# Patient Record
Sex: MALE | URBAN - METROPOLITAN AREA
[De-identification: names, ages, dates, MRNs, and addresses within clinical notes are randomized per-mention and may not be internally consistent; named-entity substitution may affect disease eponyms.]

---

## 2017-04-27 ENCOUNTER — INPATIENT (INPATIENT)
Facility: HOSPITAL | Age: 52
LOS: 1 days | Discharge: HOME | End: 2017-04-29
Attending: INTERNAL MEDICINE | Admitting: INTERNAL MEDICINE
Payer: MEDICAID

## 2017-04-27 VITALS
TEMPERATURE: 99 F | HEART RATE: 103 BPM | RESPIRATION RATE: 16 BRPM | OXYGEN SATURATION: 100 % | SYSTOLIC BLOOD PRESSURE: 133 MMHG | DIASTOLIC BLOOD PRESSURE: 94 MMHG

## 2017-04-27 DIAGNOSIS — L03.90 CELLULITIS, UNSPECIFIED: ICD-10-CM

## 2017-04-27 LAB
ALBUMIN SERPL ELPH-MCNC: 3.5 G/DL — SIGNIFICANT CHANGE UP (ref 3.3–5)
ALP SERPL-CCNC: 54 U/L — SIGNIFICANT CHANGE UP (ref 40–120)
ALT FLD-CCNC: 23 U/L — SIGNIFICANT CHANGE UP (ref 4–41)
AST SERPL-CCNC: 25 U/L — SIGNIFICANT CHANGE UP (ref 4–40)
BASE EXCESS BLDV CALC-SCNC: 1.8 MMOL/L — SIGNIFICANT CHANGE UP
BILIRUB SERPL-MCNC: 0.3 MG/DL — SIGNIFICANT CHANGE UP (ref 0.2–1.2)
BLOOD GAS VENOUS - CREATININE: 0.68 MG/DL — SIGNIFICANT CHANGE UP (ref 0.5–1.3)
BUN SERPL-MCNC: 7 MG/DL — SIGNIFICANT CHANGE UP (ref 7–23)
CALCIUM SERPL-MCNC: 8.8 MG/DL — SIGNIFICANT CHANGE UP (ref 8.4–10.5)
CHLORIDE BLDV-SCNC: 102 MMOL/L — SIGNIFICANT CHANGE UP (ref 96–108)
CHLORIDE SERPL-SCNC: 97 MMOL/L — LOW (ref 98–107)
CO2 SERPL-SCNC: 23 MMOL/L — SIGNIFICANT CHANGE UP (ref 22–31)
CREAT SERPL-MCNC: 0.7 MG/DL — SIGNIFICANT CHANGE UP (ref 0.5–1.3)
GAS PNL BLDV: 129 MMOL/L — LOW (ref 136–146)
GLUCOSE BLDV-MCNC: 113 — HIGH (ref 70–99)
GLUCOSE SERPL-MCNC: 112 MG/DL — HIGH (ref 70–99)
HCO3 BLDV-SCNC: 26 MMOL/L — SIGNIFICANT CHANGE UP (ref 20–27)
HCT VFR BLD CALC: 41.2 % — SIGNIFICANT CHANGE UP (ref 39–50)
HCT VFR BLDV CALC: 42 % — SIGNIFICANT CHANGE UP (ref 39–51)
HGB BLD-MCNC: 13.5 G/DL — SIGNIFICANT CHANGE UP (ref 13–17)
HGB BLDV-MCNC: 13.7 G/DL — SIGNIFICANT CHANGE UP (ref 13–17)
LACTATE BLDV-MCNC: 1.2 MMOL/L — SIGNIFICANT CHANGE UP (ref 0.5–2)
MCHC RBC-ENTMCNC: 29.5 PG — SIGNIFICANT CHANGE UP (ref 27–34)
MCHC RBC-ENTMCNC: 32.8 % — SIGNIFICANT CHANGE UP (ref 32–36)
MCV RBC AUTO: 90 FL — SIGNIFICANT CHANGE UP (ref 80–100)
PCO2 BLDV: 40 MMHG — LOW (ref 41–51)
PH BLDV: 7.43 PH — SIGNIFICANT CHANGE UP (ref 7.32–7.43)
PLATELET # BLD AUTO: 175 K/UL — SIGNIFICANT CHANGE UP (ref 150–400)
PMV BLD: 9.7 FL — SIGNIFICANT CHANGE UP (ref 7–13)
PO2 BLDV: 44 MMHG — HIGH (ref 35–40)
POTASSIUM BLDV-SCNC: 3.5 MMOL/L — SIGNIFICANT CHANGE UP (ref 3.4–4.5)
POTASSIUM SERPL-MCNC: 4 MMOL/L — SIGNIFICANT CHANGE UP (ref 3.5–5.3)
POTASSIUM SERPL-SCNC: 4 MMOL/L — SIGNIFICANT CHANGE UP (ref 3.5–5.3)
PROT SERPL-MCNC: 7.2 G/DL — SIGNIFICANT CHANGE UP (ref 6–8.3)
RBC # BLD: 4.58 M/UL — SIGNIFICANT CHANGE UP (ref 4.2–5.8)
RBC # FLD: 13.6 % — SIGNIFICANT CHANGE UP (ref 10.3–14.5)
SAO2 % BLDV: 77.4 % — SIGNIFICANT CHANGE UP (ref 60–85)
SODIUM SERPL-SCNC: 133 MMOL/L — LOW (ref 135–145)
WBC # BLD: 6.97 K/UL — SIGNIFICANT CHANGE UP (ref 3.8–10.5)
WBC # FLD AUTO: 6.97 K/UL — SIGNIFICANT CHANGE UP (ref 3.8–10.5)

## 2017-04-27 PROCEDURE — 99223 1ST HOSP IP/OBS HIGH 75: CPT

## 2017-04-27 RX ORDER — SODIUM CHLORIDE 9 MG/ML
2000 INJECTION INTRAMUSCULAR; INTRAVENOUS; SUBCUTANEOUS ONCE
Qty: 0 | Refills: 0 | Status: COMPLETED | OUTPATIENT
Start: 2017-04-27 | End: 2017-04-27

## 2017-04-27 RX ORDER — ACETAMINOPHEN 500 MG
650 TABLET ORAL ONCE
Qty: 0 | Refills: 0 | Status: COMPLETED | OUTPATIENT
Start: 2017-04-27 | End: 2017-04-27

## 2017-04-27 RX ADMIN — SODIUM CHLORIDE 2000 MILLILITER(S): 9 INJECTION INTRAMUSCULAR; INTRAVENOUS; SUBCUTANEOUS at 22:10

## 2017-04-27 RX ADMIN — Medication 650 MILLIGRAM(S): at 22:10

## 2017-04-27 RX ADMIN — Medication 100 MILLIGRAM(S): at 22:10

## 2017-04-27 RX ADMIN — Medication 650 MILLIGRAM(S): at 23:20

## 2017-04-27 NOTE — ED ADULT NURSE NOTE - OBJECTIVE STATEMENT
Pt received into intake room 9 co right thigh pain,  swelling, redness x 3 days worsening today. Pt A&Ox4, in NAD. Pt right thigh swollen, large reddened area; pt states pain is 2/10. Pt denies trauma to area, recent illness. code sepsis called, Md evaluated, Vs as noted. 20 g Iv inserted to L AC, labs sent, cultures sent. Medicated as ordered. Family at bedside, call bell within reach, will continue to monitor closely.

## 2017-04-27 NOTE — ED ADULT TRIAGE NOTE - CHIEF COMPLAINT QUOTE
pt c/o of right upper thigh redness with pain swelling and fever for the past four days. Pt noted with erythema skin warm to touch he denies any lip or tongue swelling denies any injury.

## 2017-04-27 NOTE — H&P ADULT. - PROBLEM SELECTOR PLAN 1
- Continue with clindamycin  - Tylenol as needed for fever or pain - Continue with clindamycin  - Tylenol as needed for fever or pain  - Follow up RLE ultrasound

## 2017-04-27 NOTE — ED PROVIDER NOTE - OBJECTIVE STATEMENT
51 yr old male with PMH of HTN presents with 4 days of anterior right leg redness, pain, tenderness and swelling.  States for 2 years has had mild swelling in leg but Monday redness started and extended.  Denies trouble walking, pain to feet, discoloration, calf tenderness.

## 2017-04-27 NOTE — H&P ADULT. - PROBLEM SELECTOR PLAN 2
- Low sodium diet  - Metoprolol succinate 100mg PO dailyl - Low sodium diet  - Metoprolol succinate 100mg PO daily  - Add amlodipine for further BP titrate if needed

## 2017-04-27 NOTE — H&P ADULT. - LYMPHATIC
supraclavicular R/anterior cervical R/posterior cervical L/anterior cervical L/supraclavicular L/posterior cervical R

## 2017-04-27 NOTE — H&P ADULT. - HISTORY OF PRESENT ILLNESS
51 y.o. man with history of essential hypertension who was sent to the ER by his PMD for evaluation of right thigh erythema, edema, and pain of 4 days in duration. patient states that he was in his usual state of health when his symptoms started. He reports right thigh erythema, edema, and progressive pain. In ED, patient was given clindamycin. Presently, he reports ongoing mild right thigh pain. No other complaints.

## 2017-04-27 NOTE — ED ADULT NURSE NOTE - ED STAT RN HANDOFF DETAILS
Patient is in NAD, and has room available.  Report given to nurse on floor via phone.  Patient awaiting transportation.  Will continue to monitor patient closely. YOHAN Miller R.N.

## 2017-04-28 DIAGNOSIS — C85.90 NON-HODGKIN LYMPHOMA, UNSPECIFIED, UNSPECIFIED SITE: ICD-10-CM

## 2017-04-28 DIAGNOSIS — L03.115 CELLULITIS OF RIGHT LOWER LIMB: ICD-10-CM

## 2017-04-28 DIAGNOSIS — I10 ESSENTIAL (PRIMARY) HYPERTENSION: ICD-10-CM

## 2017-04-28 DIAGNOSIS — Z29.9 ENCOUNTER FOR PROPHYLACTIC MEASURES, UNSPECIFIED: ICD-10-CM

## 2017-04-28 LAB
BASOPHILS # BLD AUTO: 0.02 K/UL — SIGNIFICANT CHANGE UP (ref 0–0.2)
BASOPHILS NFR BLD AUTO: 0.3 % — SIGNIFICANT CHANGE UP (ref 0–2)
BUN SERPL-MCNC: 5 MG/DL — LOW (ref 7–23)
CALCIUM SERPL-MCNC: 8.3 MG/DL — LOW (ref 8.4–10.5)
CHLORIDE SERPL-SCNC: 103 MMOL/L — SIGNIFICANT CHANGE UP (ref 98–107)
CO2 SERPL-SCNC: 22 MMOL/L — SIGNIFICANT CHANGE UP (ref 22–31)
CREAT SERPL-MCNC: 0.64 MG/DL — SIGNIFICANT CHANGE UP (ref 0.5–1.3)
EOSINOPHIL # BLD AUTO: 0.09 K/UL — SIGNIFICANT CHANGE UP (ref 0–0.5)
EOSINOPHIL NFR BLD AUTO: 1.4 % — SIGNIFICANT CHANGE UP (ref 0–6)
GLUCOSE SERPL-MCNC: 100 MG/DL — HIGH (ref 70–99)
HCT VFR BLD CALC: 40.2 % — SIGNIFICANT CHANGE UP (ref 39–50)
HGB BLD-MCNC: 13.1 G/DL — SIGNIFICANT CHANGE UP (ref 13–17)
IMM GRANULOCYTES NFR BLD AUTO: 0.2 % — SIGNIFICANT CHANGE UP (ref 0–1.5)
LYMPHOCYTES # BLD AUTO: 2.76 K/UL — SIGNIFICANT CHANGE UP (ref 1–3.3)
LYMPHOCYTES # BLD AUTO: 42.4 % — SIGNIFICANT CHANGE UP (ref 13–44)
MCHC RBC-ENTMCNC: 29.5 PG — SIGNIFICANT CHANGE UP (ref 27–34)
MCHC RBC-ENTMCNC: 32.6 % — SIGNIFICANT CHANGE UP (ref 32–36)
MCV RBC AUTO: 90.5 FL — SIGNIFICANT CHANGE UP (ref 80–100)
MONOCYTES # BLD AUTO: 0.44 K/UL — SIGNIFICANT CHANGE UP (ref 0–0.9)
MONOCYTES NFR BLD AUTO: 6.8 % — SIGNIFICANT CHANGE UP (ref 2–14)
NEUTROPHILS # BLD AUTO: 3.19 K/UL — SIGNIFICANT CHANGE UP (ref 1.8–7.4)
NEUTROPHILS NFR BLD AUTO: 48.9 % — SIGNIFICANT CHANGE UP (ref 43–77)
PLATELET # BLD AUTO: 166 K/UL — SIGNIFICANT CHANGE UP (ref 150–400)
PMV BLD: 9.8 FL — SIGNIFICANT CHANGE UP (ref 7–13)
POTASSIUM SERPL-MCNC: 4.1 MMOL/L — SIGNIFICANT CHANGE UP (ref 3.5–5.3)
POTASSIUM SERPL-SCNC: 4.1 MMOL/L — SIGNIFICANT CHANGE UP (ref 3.5–5.3)
RBC # BLD: 4.44 M/UL — SIGNIFICANT CHANGE UP (ref 4.2–5.8)
RBC # FLD: 13.7 % — SIGNIFICANT CHANGE UP (ref 10.3–14.5)
SODIUM SERPL-SCNC: 138 MMOL/L — SIGNIFICANT CHANGE UP (ref 135–145)
SPECIMEN SOURCE: SIGNIFICANT CHANGE UP
SPECIMEN SOURCE: SIGNIFICANT CHANGE UP
WBC # BLD: 6.51 K/UL — SIGNIFICANT CHANGE UP (ref 3.8–10.5)
WBC # FLD AUTO: 6.51 K/UL — SIGNIFICANT CHANGE UP (ref 3.8–10.5)

## 2017-04-28 PROCEDURE — 99222 1ST HOSP IP/OBS MODERATE 55: CPT

## 2017-04-28 PROCEDURE — 93971 EXTREMITY STUDY: CPT | Mod: 26,LT

## 2017-04-28 RX ORDER — ENOXAPARIN SODIUM 100 MG/ML
40 INJECTION SUBCUTANEOUS DAILY
Qty: 0 | Refills: 0 | Status: DISCONTINUED | OUTPATIENT
Start: 2017-04-28 | End: 2017-04-29

## 2017-04-28 RX ORDER — METOPROLOL TARTRATE 50 MG
100 TABLET ORAL DAILY
Qty: 0 | Refills: 0 | Status: DISCONTINUED | OUTPATIENT
Start: 2017-04-28 | End: 2017-04-29

## 2017-04-28 RX ORDER — ACETAMINOPHEN 500 MG
650 TABLET ORAL EVERY 6 HOURS
Qty: 0 | Refills: 0 | Status: DISCONTINUED | OUTPATIENT
Start: 2017-04-28 | End: 2017-04-29

## 2017-04-28 RX ORDER — LANOLIN ALCOHOL/MO/W.PET/CERES
3 CREAM (GRAM) TOPICAL AT BEDTIME
Qty: 0 | Refills: 0 | Status: DISCONTINUED | OUTPATIENT
Start: 2017-04-28 | End: 2017-04-29

## 2017-04-28 RX ADMIN — Medication 100 MILLIGRAM(S): at 05:44

## 2017-04-28 RX ADMIN — Medication 100 MILLIGRAM(S): at 12:06

## 2017-04-28 RX ADMIN — Medication 3 MILLIGRAM(S): at 23:59

## 2017-04-28 RX ADMIN — ENOXAPARIN SODIUM 40 MILLIGRAM(S): 100 INJECTION SUBCUTANEOUS at 21:51

## 2017-04-28 RX ADMIN — Medication 100 MILLIGRAM(S): at 21:50

## 2017-04-29 VITALS
SYSTOLIC BLOOD PRESSURE: 131 MMHG | TEMPERATURE: 99 F | HEART RATE: 79 BPM | RESPIRATION RATE: 18 BRPM | DIASTOLIC BLOOD PRESSURE: 81 MMHG | OXYGEN SATURATION: 100 %

## 2017-04-29 LAB
BUN SERPL-MCNC: 6 MG/DL — LOW (ref 7–23)
CALCIUM SERPL-MCNC: 8.4 MG/DL — SIGNIFICANT CHANGE UP (ref 8.4–10.5)
CHLORIDE SERPL-SCNC: 100 MMOL/L — SIGNIFICANT CHANGE UP (ref 98–107)
CO2 SERPL-SCNC: 21 MMOL/L — LOW (ref 22–31)
CREAT SERPL-MCNC: 0.61 MG/DL — SIGNIFICANT CHANGE UP (ref 0.5–1.3)
GLUCOSE SERPL-MCNC: 94 MG/DL — SIGNIFICANT CHANGE UP (ref 70–99)
HCT VFR BLD CALC: 39 % — SIGNIFICANT CHANGE UP (ref 39–50)
HGB BLD-MCNC: 12.7 G/DL — LOW (ref 13–17)
MCHC RBC-ENTMCNC: 29.3 PG — SIGNIFICANT CHANGE UP (ref 27–34)
MCHC RBC-ENTMCNC: 32.6 % — SIGNIFICANT CHANGE UP (ref 32–36)
MCV RBC AUTO: 90.1 FL — SIGNIFICANT CHANGE UP (ref 80–100)
PLATELET # BLD AUTO: 204 K/UL — SIGNIFICANT CHANGE UP (ref 150–400)
PMV BLD: 9.6 FL — SIGNIFICANT CHANGE UP (ref 7–13)
POTASSIUM SERPL-MCNC: 3.8 MMOL/L — SIGNIFICANT CHANGE UP (ref 3.5–5.3)
POTASSIUM SERPL-SCNC: 3.8 MMOL/L — SIGNIFICANT CHANGE UP (ref 3.5–5.3)
RBC # BLD: 4.33 M/UL — SIGNIFICANT CHANGE UP (ref 4.2–5.8)
RBC # FLD: 13.9 % — SIGNIFICANT CHANGE UP (ref 10.3–14.5)
SODIUM SERPL-SCNC: 134 MMOL/L — LOW (ref 135–145)
WBC # BLD: 6.09 K/UL — SIGNIFICANT CHANGE UP (ref 3.8–10.5)
WBC # FLD AUTO: 6.09 K/UL — SIGNIFICANT CHANGE UP (ref 3.8–10.5)

## 2017-04-29 PROCEDURE — 99232 SBSQ HOSP IP/OBS MODERATE 35: CPT

## 2017-04-29 RX ORDER — CEPHALEXIN 500 MG
1 CAPSULE ORAL
Qty: 28 | Refills: 0 | OUTPATIENT
Start: 2017-04-29 | End: 2017-05-06

## 2017-04-29 RX ADMIN — Medication 100 MILLIGRAM(S): at 06:04

## 2017-04-29 NOTE — DISCHARGE NOTE ADULT - MEDICATION SUMMARY - MEDICATIONS TO TAKE
I will START or STAY ON the medications listed below when I get home from the hospital:    metoprolol succinate 100 mg oral tablet, extended release  -- 1 tab(s) by mouth once a day  -- Indication: For Blood Pressure    Keflex 500 mg oral capsule  -- 1 cap(s) by mouth 4 times a day  -- Finish all this medication unless otherwise directed by prescriber.    -- Indication: For Cellulitis

## 2017-04-29 NOTE — DISCHARGE NOTE ADULT - HOSPITAL COURSE
51 y.o. man with history of lymphoma now with right lower extremity cellulitis.  Received clindamycin IV.   RLE US negative for DVT.  Followed by House ID who recommended Keflex 500mg PO 4x daily on d/c for 7 days.    Essential hypertension:  - Low sodium diet  - Metoprolol succinate 100mg PO daily      Lymphoma, unspecified body region, unspecified lymphoma type:  - Clinically stable.       Medically cleared for d/c home.

## 2017-04-29 NOTE — DISCHARGE NOTE ADULT - PLAN OF CARE
Finish antibiotics Complete antibiotics as ordered.  Follow up with your PMD within one week of discharge to discuss your admission. Continue with medication as prescribed.

## 2017-04-29 NOTE — DISCHARGE NOTE ADULT - PATIENT PORTAL LINK FT
“You can access the [facility] Patient Portal, offered by [facility], by registering with the following website: [email or web address]”

## 2017-05-02 LAB
BACTERIA BLD CULT: SIGNIFICANT CHANGE UP
BACTERIA BLD CULT: SIGNIFICANT CHANGE UP

## 2017-06-26 ENCOUNTER — INPATIENT (INPATIENT)
Facility: HOSPITAL | Age: 52
LOS: 3 days | Discharge: HOME | End: 2017-06-30
Attending: HOSPITALIST | Admitting: HOSPITALIST
Payer: MEDICAID

## 2017-06-26 VITALS
DIASTOLIC BLOOD PRESSURE: 91 MMHG | HEART RATE: 107 BPM | RESPIRATION RATE: 16 BRPM | SYSTOLIC BLOOD PRESSURE: 147 MMHG | TEMPERATURE: 103 F | OXYGEN SATURATION: 98 %

## 2017-06-26 NOTE — ED ADULT TRIAGE NOTE - CHIEF COMPLAINT QUOTE
Pt c/o fever, chills and R leg pain and redness since yesterday. Denies n/v/d, abdominal pain, chest pain. Last took 400mg Advil at 8pm

## 2017-06-27 DIAGNOSIS — Z29.9 ENCOUNTER FOR PROPHYLACTIC MEASURES, UNSPECIFIED: ICD-10-CM

## 2017-06-27 DIAGNOSIS — L03.115 CELLULITIS OF RIGHT LOWER LIMB: ICD-10-CM

## 2017-06-27 DIAGNOSIS — L03.90 CELLULITIS, UNSPECIFIED: ICD-10-CM

## 2017-06-27 DIAGNOSIS — A41.9 SEPSIS, UNSPECIFIED ORGANISM: ICD-10-CM

## 2017-06-27 DIAGNOSIS — C85.90 NON-HODGKIN LYMPHOMA, UNSPECIFIED, UNSPECIFIED SITE: ICD-10-CM

## 2017-06-27 DIAGNOSIS — I10 ESSENTIAL (PRIMARY) HYPERTENSION: ICD-10-CM

## 2017-06-27 LAB
ALBUMIN SERPL ELPH-MCNC: 3.4 G/DL — SIGNIFICANT CHANGE UP (ref 3.3–5)
ALP SERPL-CCNC: 47 U/L — SIGNIFICANT CHANGE UP (ref 40–120)
ALT FLD-CCNC: 19 U/L — SIGNIFICANT CHANGE UP (ref 4–41)
APPEARANCE UR: CLEAR — SIGNIFICANT CHANGE UP
AST SERPL-CCNC: 23 U/L — SIGNIFICANT CHANGE UP (ref 4–40)
BACTERIA # UR AUTO: SIGNIFICANT CHANGE UP
BASE EXCESS BLDV CALC-SCNC: 0.2 MMOL/L — SIGNIFICANT CHANGE UP
BASOPHILS # BLD AUTO: 0.01 K/UL — SIGNIFICANT CHANGE UP (ref 0–0.2)
BASOPHILS NFR BLD AUTO: 0.1 % — SIGNIFICANT CHANGE UP (ref 0–2)
BILIRUB SERPL-MCNC: 0.4 MG/DL — SIGNIFICANT CHANGE UP (ref 0.2–1.2)
BILIRUB UR-MCNC: NEGATIVE — SIGNIFICANT CHANGE UP
BLOOD GAS VENOUS - CREATININE: 0.76 MG/DL — SIGNIFICANT CHANGE UP (ref 0.5–1.3)
BLOOD UR QL VISUAL: NEGATIVE — SIGNIFICANT CHANGE UP
BUN SERPL-MCNC: 9 MG/DL — SIGNIFICANT CHANGE UP (ref 7–23)
CALCIUM SERPL-MCNC: 8.5 MG/DL — SIGNIFICANT CHANGE UP (ref 8.4–10.5)
CHLORIDE BLDV-SCNC: 99 MMOL/L — SIGNIFICANT CHANGE UP (ref 96–108)
CHLORIDE SERPL-SCNC: 95 MMOL/L — LOW (ref 98–107)
CO2 SERPL-SCNC: 22 MMOL/L — SIGNIFICANT CHANGE UP (ref 22–31)
COLOR SPEC: SIGNIFICANT CHANGE UP
CREAT SERPL-MCNC: 0.76 MG/DL — SIGNIFICANT CHANGE UP (ref 0.5–1.3)
EOSINOPHIL # BLD AUTO: 0 K/UL — SIGNIFICANT CHANGE UP (ref 0–0.5)
EOSINOPHIL NFR BLD AUTO: 0 % — SIGNIFICANT CHANGE UP (ref 0–6)
GAS PNL BLDV: 128 MMOL/L — LOW (ref 136–146)
GLUCOSE BLDV-MCNC: 145 — HIGH (ref 70–99)
GLUCOSE SERPL-MCNC: 140 MG/DL — HIGH (ref 70–99)
GLUCOSE UR-MCNC: NEGATIVE — SIGNIFICANT CHANGE UP
HCO3 BLDV-SCNC: 25 MMOL/L — SIGNIFICANT CHANGE UP (ref 20–27)
HCT VFR BLD CALC: 39.4 % — SIGNIFICANT CHANGE UP (ref 39–50)
HCT VFR BLDV CALC: 39.6 % — SIGNIFICANT CHANGE UP (ref 39–51)
HGB BLD-MCNC: 12.7 G/DL — LOW (ref 13–17)
HGB BLDV-MCNC: 12.9 G/DL — LOW (ref 13–17)
IMM GRANULOCYTES NFR BLD AUTO: 0.4 % — SIGNIFICANT CHANGE UP (ref 0–1.5)
KETONES UR-MCNC: NEGATIVE — SIGNIFICANT CHANGE UP
LACTATE BLDV-MCNC: 1.7 MMOL/L — SIGNIFICANT CHANGE UP (ref 0.5–2)
LEUKOCYTE ESTERASE UR-ACNC: NEGATIVE — SIGNIFICANT CHANGE UP
LYMPHOCYTES # BLD AUTO: 0.67 K/UL — LOW (ref 1–3.3)
LYMPHOCYTES # BLD AUTO: 7.9 % — LOW (ref 13–44)
MCHC RBC-ENTMCNC: 28.9 PG — SIGNIFICANT CHANGE UP (ref 27–34)
MCHC RBC-ENTMCNC: 32.2 % — SIGNIFICANT CHANGE UP (ref 32–36)
MCV RBC AUTO: 89.7 FL — SIGNIFICANT CHANGE UP (ref 80–100)
MONOCYTES # BLD AUTO: 0.28 K/UL — SIGNIFICANT CHANGE UP (ref 0–0.9)
MONOCYTES NFR BLD AUTO: 3.3 % — SIGNIFICANT CHANGE UP (ref 2–14)
MUCOUS THREADS # UR AUTO: SIGNIFICANT CHANGE UP
NEUTROPHILS # BLD AUTO: 7.49 K/UL — HIGH (ref 1.8–7.4)
NEUTROPHILS NFR BLD AUTO: 88.3 % — HIGH (ref 43–77)
NITRITE UR-MCNC: NEGATIVE — SIGNIFICANT CHANGE UP
PCO2 BLDV: 35 MMHG — LOW (ref 41–51)
PH BLDV: 7.45 PH — HIGH (ref 7.32–7.43)
PH UR: 6.5 — SIGNIFICANT CHANGE UP (ref 4.6–8)
PLATELET # BLD AUTO: 168 K/UL — SIGNIFICANT CHANGE UP (ref 150–400)
PMV BLD: 9.7 FL — SIGNIFICANT CHANGE UP (ref 7–13)
PO2 BLDV: 55 MMHG — HIGH (ref 35–40)
POTASSIUM BLDV-SCNC: 3.3 MMOL/L — LOW (ref 3.4–4.5)
POTASSIUM SERPL-MCNC: 3.5 MMOL/L — SIGNIFICANT CHANGE UP (ref 3.5–5.3)
POTASSIUM SERPL-SCNC: 3.5 MMOL/L — SIGNIFICANT CHANGE UP (ref 3.5–5.3)
PROT SERPL-MCNC: 6.6 G/DL — SIGNIFICANT CHANGE UP (ref 6–8.3)
PROT UR-MCNC: NEGATIVE — SIGNIFICANT CHANGE UP
RBC # BLD: 4.39 M/UL — SIGNIFICANT CHANGE UP (ref 4.2–5.8)
RBC # FLD: 13.8 % — SIGNIFICANT CHANGE UP (ref 10.3–14.5)
RBC CASTS # UR COMP ASSIST: SIGNIFICANT CHANGE UP (ref 0–?)
SAO2 % BLDV: 87.3 % — HIGH (ref 60–85)
SODIUM SERPL-SCNC: 130 MMOL/L — LOW (ref 135–145)
SP GR SPEC: 1 — LOW (ref 1–1.03)
UROBILINOGEN FLD QL: NORMAL E.U. — SIGNIFICANT CHANGE UP (ref 0.1–0.2)
WBC # BLD: 8.48 K/UL — SIGNIFICANT CHANGE UP (ref 3.8–10.5)
WBC # FLD AUTO: 8.48 K/UL — SIGNIFICANT CHANGE UP (ref 3.8–10.5)
WBC UR QL: SIGNIFICANT CHANGE UP (ref 0–?)

## 2017-06-27 PROCEDURE — 99232 SBSQ HOSP IP/OBS MODERATE 35: CPT

## 2017-06-27 PROCEDURE — 71010: CPT | Mod: 26

## 2017-06-27 PROCEDURE — 99222 1ST HOSP IP/OBS MODERATE 55: CPT

## 2017-06-27 PROCEDURE — 99223 1ST HOSP IP/OBS HIGH 75: CPT | Mod: GC

## 2017-06-27 PROCEDURE — 12345: CPT | Mod: NC

## 2017-06-27 RX ORDER — METOPROLOL TARTRATE 50 MG
100 TABLET ORAL DAILY
Qty: 0 | Refills: 0 | Status: DISCONTINUED | OUTPATIENT
Start: 2017-06-27 | End: 2017-06-27

## 2017-06-27 RX ORDER — CEFAZOLIN SODIUM 1 G
1000 VIAL (EA) INJECTION EVERY 8 HOURS
Qty: 0 | Refills: 0 | Status: DISCONTINUED | OUTPATIENT
Start: 2017-06-27 | End: 2017-06-30

## 2017-06-27 RX ORDER — VANCOMYCIN HCL 1 G
1000 VIAL (EA) INTRAVENOUS ONCE
Qty: 0 | Refills: 0 | Status: COMPLETED | OUTPATIENT
Start: 2017-06-27 | End: 2017-06-27

## 2017-06-27 RX ORDER — SODIUM CHLORIDE 9 MG/ML
1000 INJECTION INTRAMUSCULAR; INTRAVENOUS; SUBCUTANEOUS
Qty: 0 | Refills: 0 | Status: DISCONTINUED | OUTPATIENT
Start: 2017-06-27 | End: 2017-06-30

## 2017-06-27 RX ORDER — ACETAMINOPHEN 500 MG
650 TABLET ORAL EVERY 6 HOURS
Qty: 0 | Refills: 0 | Status: DISCONTINUED | OUTPATIENT
Start: 2017-06-27 | End: 2017-06-30

## 2017-06-27 RX ORDER — METOPROLOL TARTRATE 50 MG
100 TABLET ORAL DAILY
Qty: 0 | Refills: 0 | Status: DISCONTINUED | OUTPATIENT
Start: 2017-06-27 | End: 2017-06-30

## 2017-06-27 RX ORDER — SODIUM CHLORIDE 9 MG/ML
1000 INJECTION INTRAMUSCULAR; INTRAVENOUS; SUBCUTANEOUS
Qty: 0 | Refills: 0 | Status: DISCONTINUED | OUTPATIENT
Start: 2017-06-27 | End: 2017-06-27

## 2017-06-27 RX ORDER — ACETAMINOPHEN 500 MG
650 TABLET ORAL ONCE
Qty: 0 | Refills: 0 | Status: COMPLETED | OUTPATIENT
Start: 2017-06-27 | End: 2017-06-27

## 2017-06-27 RX ORDER — ENOXAPARIN SODIUM 100 MG/ML
40 INJECTION SUBCUTANEOUS EVERY 24 HOURS
Qty: 0 | Refills: 0 | Status: DISCONTINUED | OUTPATIENT
Start: 2017-06-27 | End: 2017-06-30

## 2017-06-27 RX ORDER — SODIUM CHLORIDE 9 MG/ML
2000 INJECTION INTRAMUSCULAR; INTRAVENOUS; SUBCUTANEOUS ONCE
Qty: 0 | Refills: 0 | Status: COMPLETED | OUTPATIENT
Start: 2017-06-27 | End: 2017-06-27

## 2017-06-27 RX ADMIN — SODIUM CHLORIDE 2000 MILLILITER(S): 9 INJECTION INTRAMUSCULAR; INTRAVENOUS; SUBCUTANEOUS at 00:58

## 2017-06-27 RX ADMIN — Medication 650 MILLIGRAM(S): at 21:37

## 2017-06-27 RX ADMIN — Medication 650 MILLIGRAM(S): at 14:05

## 2017-06-27 RX ADMIN — Medication 100 MILLIGRAM(S): at 07:12

## 2017-06-27 RX ADMIN — Medication 650 MILLIGRAM(S): at 00:58

## 2017-06-27 RX ADMIN — Medication 100 MILLIGRAM(S): at 17:33

## 2017-06-27 RX ADMIN — ENOXAPARIN SODIUM 40 MILLIGRAM(S): 100 INJECTION SUBCUTANEOUS at 07:12

## 2017-06-27 RX ADMIN — SODIUM CHLORIDE 100 MILLILITER(S): 9 INJECTION INTRAMUSCULAR; INTRAVENOUS; SUBCUTANEOUS at 17:29

## 2017-06-27 RX ADMIN — Medication 100 MILLIGRAM(S): at 06:25

## 2017-06-27 RX ADMIN — Medication 100 MILLIGRAM(S): at 13:12

## 2017-06-27 RX ADMIN — Medication 250 MILLIGRAM(S): at 01:26

## 2017-06-27 RX ADMIN — SODIUM CHLORIDE 100 MILLILITER(S): 9 INJECTION INTRAMUSCULAR; INTRAVENOUS; SUBCUTANEOUS at 07:12

## 2017-06-27 NOTE — H&P ADULT - NSHPLABSRESULTS_GEN_ALL_CORE
130<L>  |  95<L>  |  9   ----------------------------<  140<H>  3.5   |  22  |  0.76    Ca    8.5      2017 00:12    TPro  6.6  /  Alb  3.4  /  TBili  0.4  /  DBili  x   /  AST  23  /  ALT  19  /  AlkPhos  47                      Urinalysis Basic - ( 2017 03:10 )    Color: LT. YELLOW / Appearance: CLEAR / S.004 / pH: 6.5  Gluc: NEGATIVE / Ketone: NEGATIVE  / Bili: NEGATIVE / Urobili: NORMAL E.U.   Blood: NEGATIVE / Protein: NEGATIVE / Nitrite: NEGATIVE   Leuk Esterase: NEGATIVE / RBC: 0-2 / WBC 0-2   Sq Epi: x / Non Sq Epi: x / Bacteria: FEW                            12.7   8.48  )-----------( 168      ( 2017 01:00 )             39.4       Radiology     CXR :    Poor study. no sign of an effusion, edema or PNA.

## 2017-06-27 NOTE — PROGRESS NOTE ADULT - PROBLEM SELECTOR PLAN 2
- Pt was febrile and tachycardic to 101, meets 2 out 4 SIRS requirements   - Source of infection is cellulitis of the right leg   - Clindamycin IV   - f/u blood cultures for any bacterial growth.

## 2017-06-27 NOTE — PROGRESS NOTE ADULT - PROBLEM SELECTOR PLAN 1
- Patient presented with fever, 102.9 F in the ED with associated erythema of the right leg   - Pt was previously admitted in April 2017 for cellulitis of the right leg, was treated with IV Clindamycin and Keflex PO  - Pt received one dose of vancomycin 1gm in the ED  - Clindamycin IV  iD CONSULT CALLED - Patient presented with fever, 102.9 F in the ED with associated erythema of the right leg   - Pt was previously admitted in April 2017 for cellulitis of the right leg, was treated with IV Clindamycin and Keflex PO  - Pt received one dose of vancomycin 1gm in the ED  - Clindamycin IV  R Le doppler - r/o DVT  iD CONSULT CALLED

## 2017-06-27 NOTE — H&P ADULT - NSHPSOCIALHISTORY_GEN_ALL_CORE
Social History:    Marital Status:  ( x  )    (   ) Single    (   )    (  )   Occupation:   Lives with: (  ) alone  (x  ) children   ( x ) spouse   (  ) parents  (  ) other    Substance Use (street drugs): ( x ) never used  (  ) other:  Tobacco Usage:  ( x  ) never smoked   (   ) former smoker   (   ) current smoker  (     ) pack year  (        ) last cigarette date  Alcohol Usage: never drinker

## 2017-06-27 NOTE — PATIENT PROFILE ADULT. - NS PRO PT RIGHT SUPPORT PERSON
Assumed care of pt, report receive from Rosalba TABARES. Pt AAO, updated on POC. Call light within reach. Pt resting comfortably in [facility].     same name as above

## 2017-06-27 NOTE — ED PROVIDER NOTE - ATTENDING CONTRIBUTION TO CARE
I was physically present for the E/M service provided. I agree with above history, physical, and plan which I have reviewed and edited where appropriate. I was physically present for the key portions of the service provided.    51M h/o lymphoma, no active chemo, p/w RLE redness + fever.  NAD, RLE swelling to mid-shin, erythema of thigh  IV vancomycin and admit  r/o DVT

## 2017-06-27 NOTE — H&P ADULT - NSHPREVIEWOFSYSTEMS_GEN_ALL_CORE
REVIEW OF SYSTEMS:  CONSTITUTIONAL: No  weight loss, or fatigue. + for fever, chills and sweats  EYES: No eye pain, visual disturbances, or discharge  ENMT:  No difficulty hearing, tinnitus, vertigo; No sinus or throat pain  RESPIRATORY: No cough, wheezing, or hemoptysis; No shortness of breath  CARDIOVASCULAR: No chest pain, palpitations, dizziness, or leg swelling  GASTROINTESTINAL: No abdominal or epigastric pain. No nausea, vomiting; No diarrhea or constipation.  GENITOURINARY: No dysuria, frequency, or incontinence  NEUROLOGICAL: No headaches, loss of strength, numbness, or tremors  SKIN: No itching, burning, rashes, or lesions. + for erythema on the right leg   MUSCULOSKELETAL: No joint pain or swelling; No muscle, back, or extremity pain  HEME/LYMPH: No easy bruising, or bleeding gums  ALLERY AND IMMUNOLOGIC: No hives or eczema

## 2017-06-27 NOTE — ED PROVIDER NOTE - OBJECTIVE STATEMENT
51 yr male w/ hx of lymphoma and HTN presenting with fever and RLE erythema.     Patient reports that this morning he had a high fever (reportedly 105) and later today he noticed warmth, erythema and mild tenderness of the medial aspect of his R thigh as well as his R medial shin. He denies any trauma to the region and had an admission in April 2017 for cellulitis of the same leg.    Pt reportedly had surgery in his R groin with removal of a mass (?lymph node excision) in 2011 but in unclear about the details of that procedure.    PCP: Nicho Wolf

## 2017-06-27 NOTE — H&P ADULT - ASSESSMENT
51 y.o. male with PMHx of Lymphoma (s/p chemo in 2011, now in remission), HTN, previously admitted for cellulitis of the right leg in April 2017, admitted for cellulitis of the right leg.

## 2017-06-27 NOTE — H&P ADULT - PROBLEM SELECTOR PLAN 2
- Pt was febrile and tachycardic to 101, meets 2 out 4 SIRS requirements   - Source of infection is cellulitis of the right leg   - Clindamycin IV   - f/u blood cultures - Pt was febrile and tachycardic to 101, meets 2 out 4 SIRS requirements   - Source of infection is cellulitis of the right leg   - Clindamycin IV   - f/u blood cultures for any bacterial growth.

## 2017-06-27 NOTE — H&P ADULT - PROBLEM SELECTOR PLAN 1
- Patient presented with fever, 102.9 F in the ED with associated erythema of the right leg   - Pt was previously admitted in April 2017 for cellulitis of the right leg, was treated with IV Clindamycin and Keflex PO  - Pt received one dose of vancomycin 1gm in the ED  - Clindamycin IV

## 2017-06-27 NOTE — PROGRESS NOTE ADULT - SUBJECTIVE AND OBJECTIVE BOX
Patient is a 51y old  Male who presents with a chief complaint of Fever, Chill, Redness and swelling on R thigh and Shin (2017 07:17)- ADMITTED with fever and Cellulitis      SUBJECTIVE / OVERNIGHT EVENTS:    MEDICATIONS  (STANDING):  sodium chloride 0.9%. 1000 milliLiter(s) (100 mL/Hr) IV Continuous <Continuous>  enoxaparin Injectable 40 milliGRAM(s) SubCutaneous every 24 hours  metoprolol succinate  milliGRAM(s) Oral daily  clindamycin IVPB   IV Intermittent   clindamycin IVPB 600 milliGRAM(s) IV Intermittent every 8 hours    MEDICATIONS  (PRN):  acetaminophen   Tablet 650 milliGRAM(s) Oral every 6 hours PRN For Temp greater than 38 C (100.4 F)  acetaminophen   Tablet. 650 milliGRAM(s) Oral every 6 hours PRN Mild and Moderate Pain      Vital Signs Last 24 Hrs  T(C): 37.1 (17 @ 07:03), Max: 39.4 (17 @ 23:25)  HR: 90 (17 @ 07:03) (85 - 107)  BP: 145/86 (17 @ 07:03) (125/91 - 147/91)  RR: 18 (17 @ 07:03) (16 - 18)  SpO2: 99% (17 @ 07:03) (98% - 99%)  CAPILLARY BLOOD GLUCOSE        I&O's Summary      PHYSICAL EXAM:  GENERAL: NAD, well-developed  HEAD:  Atraumatic, Normocephalic  EYES: EOMI, PERRLA, conjunctiva and sclera clear  NECK: Supple, No JVD  CHEST/LUNG: Clear to auscultation bilaterally; No wheeze  HEART: Regular rate and rhythm; No murmurs, rubs, or gallops  ABDOMEN: Soft, Nontender, Nondistended; Bowel sounds present  EXTREMITIES:  2+ Peripheral Pulses, No clubbing, cyanosis, or edema  PSYCH: AAOx3  NEUROLOGY: non-focal  SKIN: No rashes or lesions    LABS:                        12.7   8.48  )-----------( 168      ( 2017 01:00 )             39.4     -    130<L>  |  95<L>  |  9   ----------------------------<  140<H>  3.5   |  22  |  0.76    Ca    8.5      2017 00:12    TPro  6.6  /  Alb  3.4  /  TBili  0.4  /  DBili  x   /  AST  23  /  ALT  19  /  AlkPhos  47            Urinalysis Basic - ( 2017 03:10 )    Color: x / Appearance: CLEAR / S.004 / pH: 6.5  Gluc: NEGATIVE / Ketone: NEGATIVE  / Bili: NEGATIVE / Urobili: NORMAL E.U.   Blood: NEGATIVE / Protein: NEGATIVE / Nitrite: NEGATIVE   Leuk Esterase: NEGATIVE / RBC: 0-2 / WBC 0-2   Sq Epi: x / Non Sq Epi: x / Bacteria: FEW        RADIOLOGY & ADDITIONAL TESTS:    Imaging Personally Reviewed:    Consultant(s) Notes Reviewed:      Care Discussed with Consultants/Other Providers: Patient is a 51y old  Male who presents with a chief complaint of Fever, Chill, Redness and swelling on R thigh and Shin (2017 07:17)- ADMITTED with fever and Cellulitis      SUBJECTIVE / OVERNIGHT EVENTS:    MEDICATIONS  (STANDING):  sodium chloride 0.9%. 1000 milliLiter(s) (100 mL/Hr) IV Continuous <Continuous>  enoxaparin Injectable 40 milliGRAM(s) SubCutaneous every 24 hours  metoprolol succinate  milliGRAM(s) Oral daily  clindamycin IVPB   IV Intermittent   clindamycin IVPB 600 milliGRAM(s) IV Intermittent every 8 hours    MEDICATIONS  (PRN):  acetaminophen   Tablet 650 milliGRAM(s) Oral every 6 hours PRN For Temp greater than 38 C (100.4 F)  acetaminophen   Tablet. 650 milliGRAM(s) Oral every 6 hours PRN Mild and Moderate Pain      Vital Signs Last 24 Hrs  T(C): 37.1 (17 @ 07:03), Max: 39.4 (17 @ 23:25)  HR: 90 (17 @ 07:03) (85 - 107)  BP: 145/86 (17 @ 07:03) (125/91 - 147/91)  RR: 18 (17 @ 07:03) (16 - 18)  SpO2: 99% (17 @ 07:03) (98% - 99%)  CAPILLARY BLOOD GLUCOSE        I&O's Summary      PHYSICAL EXAM:  GENERAL: NAD, well-developed  HEAD:  Atraumatic, Normocephalic  EYES: EOMI, PERRLA, conjunctiva and sclera clear  NECK: Supple, No JVD  CHEST/LUNG: Clear to auscultation bilaterally; No wheeze  HEART: Regular rate and rhythm; No murmurs, rubs, or gallops  ABDOMEN: Soft, Nontender, Nondistended; Bowel sounds present  EXTREMITIES:  2+ Peripheral Pulses, R Leg erythema from ankle to r thigh with R thigh induration.  PSYCH: AAOx3  NEUROLOGY: non-focal  SKIN: No rashes or lesions    LABS:                        12.7   8.48  )-----------( 168      ( 2017 01:00 )             39.4     -    130<L>  |  95<L>  |  9   ----------------------------<  140<H>  3.5   |  22  |  0.76    Ca    8.5      2017 00:12    TPro  6.6  /  Alb  3.4  /  TBili  0.4  /  DBili  x   /  AST  23  /  ALT  19  /  AlkPhos  47  06-          Urinalysis Basic - ( 2017 03:10 )    Color: x / Appearance: CLEAR / S.004 / pH: 6.5  Gluc: NEGATIVE / Ketone: NEGATIVE  / Bili: NEGATIVE / Urobili: NORMAL E.U.   Blood: NEGATIVE / Protein: NEGATIVE / Nitrite: NEGATIVE   Leuk Esterase: NEGATIVE / RBC: 0-2 / WBC 0-2   Sq Epi: x / Non Sq Epi: x / Bacteria: FEW        RADIOLOGY & ADDITIONAL TESTS:    Imaging Personally Reviewed:    Consultant(s) Notes Reviewed:      Care Discussed with Consultants/Other Providers:

## 2017-06-27 NOTE — CONSULT NOTE ADULT - SUBJECTIVE AND OBJECTIVE BOX
"HPI:  51 y.o. male with PMHx of Lymphoma (s/p chemo in , now in remission), HTN, previously admitted for cellulitis of the right leg in 2017, presenting today with fever. The patient states he was in his usual state of health until 7pm last night when he started to have chills and sweats. The patient took is his temperature at that time and stated it was "around 105 F". The patient stated at that time he noticed his right leg was very erythematous specifically in the thigh but also at the ankles as well. The patient denies cough, nasal congestion, rhinorrhea nausea/vomting, abdominal pain, dysuria, diarrhea/constipation. The patient was previously treated for cellulitis of the right leg in 2017 with IV Clindamycin before being switched to PO Keflex on discharge."    Above reviewed. 52 yo M with history of lymphoma in remission, with episode of cellulitis in 2017 treated with Keflex/clindamycin, now returns with fevers, chills and RLE redness. Patient notes had worsening RLE redness with mild pain. Hip, knee, and ankle were not tender. Had fevers and chills at home, and continues to have fevers in the hospital. No purulence was noted, no boils, no other ulcers. He does not have cough, sob, no abd pain, no n/v/d, no cp. Aside from SSTI complaints appears overall well. Patient notes that in [place] sometimes but does not have any overt trauma to the RLE.    PAST MEDICAL & SURGICAL HISTORY:  Lymphoma, unspecified body region, unspecified lymphoma type  HTN (hypertension)  No significant past surgical history    Allergies    No Known Allergies    ANTIMICROBIALS:  ceFAZolin   IVPB 1000 every 8 hours    OTHER MEDS:  sodium chloride 0.9%. 1000 milliLiter(s) IV Continuous <Continuous>  enoxaparin Injectable 40 milliGRAM(s) SubCutaneous every 24 hours  acetaminophen   Tablet 650 milliGRAM(s) Oral every 6 hours PRN  acetaminophen   Tablet. 650 milliGRAM(s) Oral every 6 hours PRN  metoprolol succinate  milliGRAM(s) Oral daily    SOCIAL HISTORY: No tobacco, no alcohol, no illicit drugs    FAMILY HISTORY:  No pertinent family history in first degree relatives    Drug Dosing Weight  Height (cm): 167.64 (2017 07:03)  Weight (kg): 89.8 (2017 07:03)  BMI (kg/m2): 32 (2017 07:03)  BSA (m2): 1.99 (2017 07:03)    PE:    Vital Signs Last 24 Hrs  T(C): 36.7 (2017 15:47), Max: 39.4 (2017 23:25)  T(F): 98.1 (2017 15:47), Max: 102.9 (2017 23:25)  HR: 88 (2017 13:53) (85 - 107)  BP: 140/90 (2017 13:53) (125/91 - 147/91)  BP(mean): --  RR: 18 (2017 13:53) (16 - 18)  SpO2: 100% (2017 13:53) (98% - 100%)    Gen: AOx3, NAD, non-toxic, pleasant  CV: S1+S2 normal, no murmurs, nontachycardic  Resp: Clear bilat, no resp distress, no crackles/wheezes  Abd: Soft, nontender, +BS  Ext: RLE thigh redness and warmth; RLE calf redness and warmth. No bullae, no necrotic lesions, very minimal pain to palpation. (not excruciating pain). Does not extend to pelvic region. RLE appears NVI.  : No Corona  IV/Skin: No thrombophlebitis  Neuro: No focal deficits, no sensory deficits.    Labs:                        12.7   8.48  )-----------( 168      ( 2017 01:00 )             39.4     06-    130<L>  |  95<L>  |  9   ----------------------------<  140<H>  3.5   |  22  |  0.76    Ca    8.5      2017 00:12    TPro  6.6  /  Alb  3.4  /  TBili  0.4  /  DBili  x   /  AST  23  /  ALT  19  /  AlkPhos  47  06-      Urinalysis Basic - ( 2017 03:10 )    Color: x / Appearance: CLEAR / S.004 / pH: 6.5  Gluc: NEGATIVE / Ketone: NEGATIVE  / Bili: NEGATIVE / Urobili: NORMAL E.U.   Blood: NEGATIVE / Protein: NEGATIVE / Nitrite: NEGATIVE   Leuk Esterase: NEGATIVE / RBC: 0-2 / WBC 0-2   Sq Epi: x / Non Sq Epi: x / Bacteria: FEW      MICROBIOLOGY:    BCX x 2 Pending  UCX Pending    RADIOLOGY:    CXR : LLL atelectasis

## 2017-06-27 NOTE — ED ADULT NURSE NOTE - OBJECTIVE STATEMENT
Pt received in #22, aaox3 with c/o rle redness and swelling. States he was working in his garden, unknown bite/exposure/cuts, but developed his symptoms shortly thereafter. Symptoms worsening since onset. Pulse, motor and sensory intact distally of affected extremity. IV established, labs sent.

## 2017-06-27 NOTE — H&P ADULT - PROBLEM SELECTOR PLAN 3
- Patient was diagnosed with lymphoma in 2011   - s/p resection and chemothearpy, now in remission - Patient was diagnosed with lymphoma in 2011   - s/p resection and chemotherapy, now in remission

## 2017-06-27 NOTE — H&P ADULT - HISTORY OF PRESENT ILLNESS
51 y.o male with PMHx of Lymphoma (resection in 2011), HTN, previously admitted for cellulitis of the right leg in April 2017, presenting today with fever. 51 y.o. male with PMHx of Lymphoma (s/p chemo in 2011, now in remission), HTN, previously admitted for cellulitis of the right leg in April 2017, presenting today with fever. The patient states he was in his usual state of health until 7pm last night when he started to have chills and sweats. The patient took is his temperature at that time and stated it was "around 105 F". The patient stated at that time he noticed his right leg was very erythematous specifically in the thigh but also at the ankles as well. The patient denies cough, nasal congestion, rhinorrhea nausea/vomting, abdominal pain, dysuria, diarrhea/constipation. The patient was previously treated for cellulitis of the right leg in April 2017 with IV Clindamycin before being switched to PO Keflex on discharge.     In the ED    Vital Signs Last 24 Hrs  T(C): 39.4, Max: 39.4 (06-26 @ 23:25)  T(F): 102.9, Max: 102.9 (06-26 @ 23:25)  HR: 101 (101 - 107)  BP: 144/83 (144/83 - 147/91)  BP(mean): --  RR: 16 (16 - 16)  SpO2: 98% (98% - 98%)    In the ED the patient was found to be febrile to 102.9 F and tachycardic. The patient recieved one dose of 51 y.o. male with PMHx of Lymphoma (s/p chemo in 2011, now in remission), HTN, previously admitted for cellulitis of the right leg in April 2017, presenting today with fever. The patient states he was in his usual state of health until 7pm last night when he started to have chills and sweats. The patient took is his temperature at that time and stated it was "around 105 F". The patient stated at that time he noticed his right leg was very erythematous specifically in the thigh but also at the ankles as well. The patient denies cough, nasal congestion, rhinorrhea nausea/vomting, abdominal pain, dysuria, diarrhea/constipation. The patient was previously treated for cellulitis of the right leg in April 2017 with IV Clindamycin before being switched to PO Keflex on discharge.     In the ED    Vital Signs Last 24 Hrs  T(C): 39.4, Max: 39.4 (06-26 @ 23:25)  T(F): 102.9, Max: 102.9 (06-26 @ 23:25)  HR: 101 (101 - 107)  BP: 144/83 (144/83 - 147/91)  BP(mean): --  RR: 16 (16 - 16)  SpO2: 98% (98% - 98%)    In the ED the patient was found to be febrile to 102.9 F and tachycardic. The patient received one dose of vancomycin 1gm. CBC showed no leukocytosis.

## 2017-06-27 NOTE — PROVIDER CONTACT NOTE (OTHER) - ACTION/TREATMENT ORDERED:
F/u recommendation
Pt cultured less than 48 hours ago. As per ADS no Cx will be ordered at this time. Tylenol 650 mg PO will be given. Will continue to monitor.

## 2017-06-27 NOTE — PROGRESS NOTE ADULT - ASSESSMENT
51 y.o. male with PMHx of Lymphoma (s/p chemo in 2011, now in remission), HTN, previously admitted for cellulitis of the right leg in April 2017, admitted for cellulitis of the right leg.   R leg cellulitis- ID called to CONSULT.  Hyponatremia  Fever prior to admission. 51 y.o. male with PMHx of Lymphoma (s/p chemo in 2011, now in remission), HTN, previously admitted for cellulitis of the right leg in April 2017, admitted for cellulitis of the right leg.   R leg cellulitis- ID called to CONSULT.  Hyponatremia  Fever prior to admission.  R thigh pain on palpation

## 2017-06-28 LAB
ALBUMIN SERPL ELPH-MCNC: 3.2 G/DL — LOW (ref 3.3–5)
ALP SERPL-CCNC: 52 U/L — SIGNIFICANT CHANGE UP (ref 40–120)
ALT FLD-CCNC: 18 U/L — SIGNIFICANT CHANGE UP (ref 4–41)
AST SERPL-CCNC: 18 U/L — SIGNIFICANT CHANGE UP (ref 4–40)
BACTERIA UR CULT: SIGNIFICANT CHANGE UP
BASOPHILS # BLD AUTO: 0.01 K/UL — SIGNIFICANT CHANGE UP (ref 0–0.2)
BASOPHILS NFR BLD AUTO: 0.1 % — SIGNIFICANT CHANGE UP (ref 0–2)
BILIRUB SERPL-MCNC: 0.3 MG/DL — SIGNIFICANT CHANGE UP (ref 0.2–1.2)
BUN SERPL-MCNC: 4 MG/DL — LOW (ref 7–23)
BUN SERPL-MCNC: 4 MG/DL — LOW (ref 7–23)
CALCIUM SERPL-MCNC: 8.4 MG/DL — SIGNIFICANT CHANGE UP (ref 8.4–10.5)
CALCIUM SERPL-MCNC: 8.4 MG/DL — SIGNIFICANT CHANGE UP (ref 8.4–10.5)
CHLORIDE SERPL-SCNC: 101 MMOL/L — SIGNIFICANT CHANGE UP (ref 98–107)
CHLORIDE SERPL-SCNC: 101 MMOL/L — SIGNIFICANT CHANGE UP (ref 98–107)
CK SERPL-CCNC: 34 U/L — SIGNIFICANT CHANGE UP (ref 30–200)
CO2 SERPL-SCNC: 22 MMOL/L — SIGNIFICANT CHANGE UP (ref 22–31)
CO2 SERPL-SCNC: 22 MMOL/L — SIGNIFICANT CHANGE UP (ref 22–31)
CREAT SERPL-MCNC: 0.59 MG/DL — SIGNIFICANT CHANGE UP (ref 0.5–1.3)
CREAT SERPL-MCNC: 0.59 MG/DL — SIGNIFICANT CHANGE UP (ref 0.5–1.3)
EOSINOPHIL # BLD AUTO: 0.03 K/UL — SIGNIFICANT CHANGE UP (ref 0–0.5)
EOSINOPHIL NFR BLD AUTO: 0.4 % — SIGNIFICANT CHANGE UP (ref 0–6)
GLUCOSE SERPL-MCNC: 101 MG/DL — HIGH (ref 70–99)
GLUCOSE SERPL-MCNC: 101 MG/DL — HIGH (ref 70–99)
HCT VFR BLD CALC: 38.5 % — LOW (ref 39–50)
HCT VFR BLD CALC: 38.5 % — LOW (ref 39–50)
HGB BLD-MCNC: 12.5 G/DL — LOW (ref 13–17)
HGB BLD-MCNC: 12.5 G/DL — LOW (ref 13–17)
IMM GRANULOCYTES NFR BLD AUTO: 0.1 % — SIGNIFICANT CHANGE UP (ref 0–1.5)
LYMPHOCYTES # BLD AUTO: 1.81 K/UL — SIGNIFICANT CHANGE UP (ref 1–3.3)
LYMPHOCYTES # BLD AUTO: 21.7 % — SIGNIFICANT CHANGE UP (ref 13–44)
MAGNESIUM SERPL-MCNC: 1.8 MG/DL — SIGNIFICANT CHANGE UP (ref 1.6–2.6)
MAGNESIUM SERPL-MCNC: 1.8 MG/DL — SIGNIFICANT CHANGE UP (ref 1.6–2.6)
MCHC RBC-ENTMCNC: 29.3 PG — SIGNIFICANT CHANGE UP (ref 27–34)
MCHC RBC-ENTMCNC: 29.3 PG — SIGNIFICANT CHANGE UP (ref 27–34)
MCHC RBC-ENTMCNC: 32.5 % — SIGNIFICANT CHANGE UP (ref 32–36)
MCHC RBC-ENTMCNC: 32.5 % — SIGNIFICANT CHANGE UP (ref 32–36)
MCV RBC AUTO: 90.4 FL — SIGNIFICANT CHANGE UP (ref 80–100)
MCV RBC AUTO: 90.4 FL — SIGNIFICANT CHANGE UP (ref 80–100)
MONOCYTES # BLD AUTO: 0.43 K/UL — SIGNIFICANT CHANGE UP (ref 0–0.9)
MONOCYTES NFR BLD AUTO: 5.2 % — SIGNIFICANT CHANGE UP (ref 2–14)
NEUTROPHILS # BLD AUTO: 6.05 K/UL — SIGNIFICANT CHANGE UP (ref 1.8–7.4)
NEUTROPHILS NFR BLD AUTO: 72.5 % — SIGNIFICANT CHANGE UP (ref 43–77)
PHOSPHATE SERPL-MCNC: 1.9 MG/DL — LOW (ref 2.5–4.5)
PHOSPHATE SERPL-MCNC: 1.9 MG/DL — LOW (ref 2.5–4.5)
PLATELET # BLD AUTO: 180 K/UL — SIGNIFICANT CHANGE UP (ref 150–400)
PLATELET # BLD AUTO: 180 K/UL — SIGNIFICANT CHANGE UP (ref 150–400)
PMV BLD: 9.6 FL — SIGNIFICANT CHANGE UP (ref 7–13)
PMV BLD: 9.6 FL — SIGNIFICANT CHANGE UP (ref 7–13)
POTASSIUM SERPL-MCNC: 3.5 MMOL/L — SIGNIFICANT CHANGE UP (ref 3.5–5.3)
POTASSIUM SERPL-MCNC: 3.5 MMOL/L — SIGNIFICANT CHANGE UP (ref 3.5–5.3)
POTASSIUM SERPL-SCNC: 3.5 MMOL/L — SIGNIFICANT CHANGE UP (ref 3.5–5.3)
POTASSIUM SERPL-SCNC: 3.5 MMOL/L — SIGNIFICANT CHANGE UP (ref 3.5–5.3)
PROT SERPL-MCNC: 6.4 G/DL — SIGNIFICANT CHANGE UP (ref 6–8.3)
RBC # BLD: 4.26 M/UL — SIGNIFICANT CHANGE UP (ref 4.2–5.8)
RBC # BLD: 4.26 M/UL — SIGNIFICANT CHANGE UP (ref 4.2–5.8)
RBC # FLD: 14.2 % — SIGNIFICANT CHANGE UP (ref 10.3–14.5)
RBC # FLD: 14.2 % — SIGNIFICANT CHANGE UP (ref 10.3–14.5)
SODIUM SERPL-SCNC: 138 MMOL/L — SIGNIFICANT CHANGE UP (ref 135–145)
SODIUM SERPL-SCNC: 138 MMOL/L — SIGNIFICANT CHANGE UP (ref 135–145)
SPECIMEN SOURCE: SIGNIFICANT CHANGE UP
WBC # BLD: 8.34 K/UL — SIGNIFICANT CHANGE UP (ref 3.8–10.5)
WBC # BLD: 8.34 K/UL — SIGNIFICANT CHANGE UP (ref 3.8–10.5)
WBC # FLD AUTO: 8.34 K/UL — SIGNIFICANT CHANGE UP (ref 3.8–10.5)
WBC # FLD AUTO: 8.34 K/UL — SIGNIFICANT CHANGE UP (ref 3.8–10.5)

## 2017-06-28 PROCEDURE — 99233 SBSQ HOSP IP/OBS HIGH 50: CPT

## 2017-06-28 PROCEDURE — 74177 CT ABD & PELVIS W/CONTRAST: CPT | Mod: 26

## 2017-06-28 PROCEDURE — 93971 EXTREMITY STUDY: CPT | Mod: 26,LT

## 2017-06-28 PROCEDURE — 99232 SBSQ HOSP IP/OBS MODERATE 35: CPT

## 2017-06-28 RX ADMIN — Medication 100 MILLIGRAM(S): at 05:04

## 2017-06-28 RX ADMIN — Medication 650 MILLIGRAM(S): at 05:04

## 2017-06-28 RX ADMIN — Medication 100 MILLIGRAM(S): at 21:07

## 2017-06-28 RX ADMIN — Medication 100 MILLIGRAM(S): at 12:50

## 2017-06-28 RX ADMIN — Medication 100 MILLIGRAM(S): at 01:30

## 2017-06-28 RX ADMIN — ENOXAPARIN SODIUM 40 MILLIGRAM(S): 100 INJECTION SUBCUTANEOUS at 05:05

## 2017-06-28 NOTE — PROGRESS NOTE ADULT - PROBLEM SELECTOR PLAN 1
- Patient presented with fever, 102.9 F in the ED with associated erythema of the right leg   - Pt was previously admitted in April 2017 for cellulitis of the right leg, was treated with IV Clindamycin and Keflex PO  - Pt received one dose of vancomycin 1gm in the ED  - Clindamycin IV  R Le doppler - r/o DVT  iD CONSULT CALLED

## 2017-06-28 NOTE — PROGRESS NOTE ADULT - ASSESSMENT
51M with NHL 2010 (inguinal LN biopsied) s/p chemo and remission 2011 s/p recent antibiotics for RLE cellulitis with relapse and associated fever.  Possibly secondary for fungal rash.  Continue ancef for now. If afebrile for 24 hours, change to oral keflex.  Topical antifungal between toes.    I have discussed plan of care with consulting team (Dr. Lizarraga).

## 2017-06-28 NOTE — PROGRESS NOTE ADULT - SUBJECTIVE AND OBJECTIVE BOX
Patient is a 51y old  Male who presents with a chief complaint of Fever, Chill, Redness and swelling on R thigh and Shin (2017 07:17)  Started on Antibiotics foe Cellulitis.    SUBJECTIVE / OVERNIGHT EVENTS:    MEDICATIONS  (STANDING):  enoxaparin Injectable 40 milliGRAM(s) SubCutaneous every 24 hours  metoprolol succinate  milliGRAM(s) Oral daily  ceFAZolin   IVPB 1000 milliGRAM(s) IV Intermittent every 8 hours  sodium chloride 0.9%. 1000 milliLiter(s) (100 mL/Hr) IV Continuous <Continuous>    MEDICATIONS  (PRN):  acetaminophen   Tablet 650 milliGRAM(s) Oral every 6 hours PRN For Temp greater than 38 C (100.4 F)  acetaminophen   Tablet. 650 milliGRAM(s) Oral every 6 hours PRN Mild and Moderate Pain      Vital Signs Last 24 Hrs  T(C): 37.1 (17 @ 06:20), Max: 38.2 (17 @ 13:53)  HR: 99 (17 @ 05:00) (88 - 99)  BP: 146/96 (17 @ 05:00) (140/90 - 146/96)  RR: 17 (17 @ 05:00) (17 - 18)  SpO2: 100% (17 @ 05:00) (99% - 100%)  CAPILLARY BLOOD GLUCOSE        I&O's Summary      PHYSICAL EXAM:  GENERAL: NAD, well-developed  HEAD:  Atraumatic, Normocephalic  EYES: EOMI, PERRLA, conjunctiva and sclera clear  NECK: Supple, No JVD  CHEST/LUNG: Clear to auscultation bilaterally; No wheeze  HEART: Regular rate and rhythm; No murmurs, rubs, or gallops  ABDOMEN: Soft, Nontender, Nondistended; Bowel sounds present  EXTREMITIES:  2+ Peripheral Pulses, No clubbing, cyanosis, or edema  PSYCH: AAOx3  NEUROLOGY: non-focal  SKIN: Positive erythema to R Leg    LABS:                        12.5   8.34  )-----------( 180      ( 2017 07:05 )             38.5     -    138  |  101  |  4<L>  ----------------------------<  101<H>  3.5   |  22  |  0.59    Ca    8.4      2017 07:05  Phos  1.9       Mg     1.8         TPro  6.4  /  Alb  3.2<L>  /  TBili  0.3  /  DBili  x   /  AST  18  /  ALT  18  /  AlkPhos  52        CARDIAC MARKERS ( 2017 07:05 )  x     / x     / 34 u/L / x     / x          Urinalysis Basic - ( 2017 03:10 )    Color: x / Appearance: CLEAR / S.004 / pH: 6.5  Gluc: NEGATIVE / Ketone: NEGATIVE  / Bili: NEGATIVE / Urobili: NORMAL E.U.   Blood: NEGATIVE / Protein: NEGATIVE / Nitrite: NEGATIVE   Leuk Esterase: NEGATIVE / RBC: 0-2 / WBC 0-2   Sq Epi: x / Non Sq Epi: x / Bacteria: FEW      RADIOLOGY & ADDITIONAL TESTS:    Imaging Personally Reviewed:    Consultant(s) Notes Reviewed:      Care Discussed with Consultants/Other Providers:  ID

## 2017-06-28 NOTE — PROGRESS NOTE ADULT - ASSESSMENT
51 y.o. male with PMHx of Lymphoma (s/p chemo in 2011, now in remission), HTN, previously admitted for cellulitis of the right leg in April 2017, admitted for cellulitis of the right leg.   R leg cellulitis- ID called to CONSULT.  Hyponatremia  Fever prior to admission.  R thigh pain on palpation

## 2017-06-28 NOTE — PROGRESS NOTE ADULT - SUBJECTIVE AND OBJECTIVE BOX
f/u cellulitis    interval hx/ROS:  feeling better.  really no pain R leg (both proximal and distal) but swelling and redness (both better).  low grade fever continues.  Rest of ROS otherwise negative.    Allergies  No Known Allergies    ANTIMICROBIALS:  ceFAZolin   IVPB 1000 every 8 hours    MEDICATIONS  (STANDING):  enoxaparin Injectable 40 every 24 hours  acetaminophen   Tablet 650 every 6 hours PRN  acetaminophen   Tablet. 650 every 6 hours PRN  metoprolol succinate  daily    Vital Signs Last 24 Hrs  T(F): 98.7 (17 @ 06:20), Max: 100.6 (17 @ 05:00)  HR: 99 (17 @ 05:00)  BP: 146/96 (17 @ 05:00)  RR: 17 (17 @ 05:00)  SpO2: 100% (17 @ 05:00) (99% - 100%)    Gen: non-toxic, pleasant  CV: no murmurs  Resp: Clear bilaterally  Abd: Soft, nontender, +BS  Ext:  R thigh with decreased erythema, no tenderness; R distal leg with decreased erythema, non-tender, (+) warmth.  fungal rash between 4th and 5th toes bilaterally.  : No Corona  IV/Skin: No thrombophlebitis  Neuro: No focal deficits, no sensory deficits.                        12.5   8.34  )-----------( 180      ( 2017 07:05 )             38.5     138  |  101  |  4   ----------------------------<  101  3.5   |  22  |  0.59  Ca    8.4      2017 07:05Phos  1.9     Mg     1.8       TPro  6.4  /  Alb  3.2  /  TBili  0.3  /  DBili  x   /  AST  18  /  ALT  18  /  AlkPhos  52        Urinalysis Basic - ( 2017 03:10 )    Color: x / Appearance: CLEAR / S.004 / pH: 6.5  Gluc: NEGATIVE / Ketone: NEGATIVE  / Bili: NEGATIVE / Urobili: NORMAL E.U.   Blood: NEGATIVE / Protein: NEGATIVE / Nitrite: NEGATIVE   Leuk Esterase: NEGATIVE / RBC: 0-2 / WBC 0-2   Sq Epi: x / Non Sq Epi: x / Bacteria: FEW      MICROBIOLOGY:  negative to date bcx    RADIOLOGY:  CXR : LLL atelectasis

## 2017-06-28 NOTE — PROGRESS NOTE ADULT - PROBLEM SELECTOR PLAN 2
- Pt was febrile and tachycardic to 101, meets 2 out 4 SIRS requirements   - Source of infection is cellulitis of the right leg   - id changed to Ancef  - f/u blood cultures for any bacterial growth.

## 2017-06-29 DIAGNOSIS — B35.3 TINEA PEDIS: ICD-10-CM

## 2017-06-29 LAB
APPEARANCE UR: CLEAR — SIGNIFICANT CHANGE UP
BILIRUB UR-MCNC: NEGATIVE — SIGNIFICANT CHANGE UP
BLOOD UR QL VISUAL: NEGATIVE — SIGNIFICANT CHANGE UP
BUN SERPL-MCNC: 3 MG/DL — LOW (ref 7–23)
CALCIUM SERPL-MCNC: 9 MG/DL — SIGNIFICANT CHANGE UP (ref 8.4–10.5)
CHLORIDE SERPL-SCNC: 100 MMOL/L — SIGNIFICANT CHANGE UP (ref 98–107)
CO2 SERPL-SCNC: 20 MMOL/L — LOW (ref 22–31)
COLOR SPEC: SIGNIFICANT CHANGE UP
CREAT SERPL-MCNC: 0.64 MG/DL — SIGNIFICANT CHANGE UP (ref 0.5–1.3)
GLUCOSE SERPL-MCNC: 90 MG/DL — SIGNIFICANT CHANGE UP (ref 70–99)
GLUCOSE UR-MCNC: NEGATIVE — SIGNIFICANT CHANGE UP
HCT VFR BLD CALC: 40.9 % — SIGNIFICANT CHANGE UP (ref 39–50)
HGB BLD-MCNC: 13.3 G/DL — SIGNIFICANT CHANGE UP (ref 13–17)
KETONES UR-MCNC: NEGATIVE — SIGNIFICANT CHANGE UP
LEUKOCYTE ESTERASE UR-ACNC: NEGATIVE — SIGNIFICANT CHANGE UP
MCHC RBC-ENTMCNC: 29 PG — SIGNIFICANT CHANGE UP (ref 27–34)
MCHC RBC-ENTMCNC: 32.5 % — SIGNIFICANT CHANGE UP (ref 32–36)
MCV RBC AUTO: 89.1 FL — SIGNIFICANT CHANGE UP (ref 80–100)
MUCOUS THREADS # UR AUTO: SIGNIFICANT CHANGE UP
NITRITE UR-MCNC: NEGATIVE — SIGNIFICANT CHANGE UP
NRBC # FLD: 0 — SIGNIFICANT CHANGE UP
PH UR: 6.5 — SIGNIFICANT CHANGE UP (ref 4.6–8)
PHOSPHATE SERPL-MCNC: 2.9 MG/DL — SIGNIFICANT CHANGE UP (ref 2.5–4.5)
PLATELET # BLD AUTO: 224 K/UL — SIGNIFICANT CHANGE UP (ref 150–400)
PMV BLD: 9.4 FL — SIGNIFICANT CHANGE UP (ref 7–13)
POTASSIUM SERPL-MCNC: 3.6 MMOL/L — SIGNIFICANT CHANGE UP (ref 3.5–5.3)
POTASSIUM SERPL-SCNC: 3.6 MMOL/L — SIGNIFICANT CHANGE UP (ref 3.5–5.3)
PROT UR-MCNC: NEGATIVE — SIGNIFICANT CHANGE UP
RBC # BLD: 4.59 M/UL — SIGNIFICANT CHANGE UP (ref 4.2–5.8)
RBC # FLD: 13.8 % — SIGNIFICANT CHANGE UP (ref 10.3–14.5)
RBC CASTS # UR COMP ASSIST: SIGNIFICANT CHANGE UP (ref 0–?)
SODIUM SERPL-SCNC: 136 MMOL/L — SIGNIFICANT CHANGE UP (ref 135–145)
SP GR SPEC: 1.01 — SIGNIFICANT CHANGE UP (ref 1–1.03)
UROBILINOGEN FLD QL: NORMAL E.U. — SIGNIFICANT CHANGE UP (ref 0.1–0.2)
WBC # BLD: 7.45 K/UL — SIGNIFICANT CHANGE UP (ref 3.8–10.5)
WBC # FLD AUTO: 7.45 K/UL — SIGNIFICANT CHANGE UP (ref 3.8–10.5)

## 2017-06-29 PROCEDURE — 99232 SBSQ HOSP IP/OBS MODERATE 35: CPT

## 2017-06-29 PROCEDURE — 73701 CT LOWER EXTREMITY W/DYE: CPT | Mod: 26,RT

## 2017-06-29 RX ORDER — KETOCONAZOLE 20 MG/G
1 AEROSOL, FOAM TOPICAL
Qty: 0 | Refills: 0 | Status: DISCONTINUED | OUTPATIENT
Start: 2017-06-29 | End: 2017-06-30

## 2017-06-29 RX ADMIN — Medication 100 MILLIGRAM(S): at 11:21

## 2017-06-29 RX ADMIN — Medication 100 MILLIGRAM(S): at 21:51

## 2017-06-29 RX ADMIN — Medication 100 MILLIGRAM(S): at 05:15

## 2017-06-29 RX ADMIN — ENOXAPARIN SODIUM 40 MILLIGRAM(S): 100 INJECTION SUBCUTANEOUS at 05:14

## 2017-06-29 RX ADMIN — KETOCONAZOLE 1 APPLICATION(S): 20 AEROSOL, FOAM TOPICAL at 18:23

## 2017-06-29 RX ADMIN — Medication 100 MILLIGRAM(S): at 05:14

## 2017-06-29 NOTE — PROGRESS NOTE ADULT - SUBJECTIVE AND OBJECTIVE BOX
CC: F/U for Cellulitis    Saw/spoke to patient. No fever overnight. RLE redness and swelling decreasing, and decreasing pain. Overall feels well.    Allergies  No Known Allergies    ANTIMICROBIALS:  ceFAZolin   IVPB 1000 every 8 hours    PE:    Vital Signs Last 24 Hrs  T(C): 37.1 (29 Jun 2017 04:58), Max: 37.9 (28 Jun 2017 21:28)  T(F): 98.8 (29 Jun 2017 04:58), Max: 100.2 (28 Jun 2017 21:28)  HR: 81 (29 Jun 2017 04:58) (81 - 93)  BP: 138/94 (29 Jun 2017 04:58) (125/85 - 138/94)  BP(mean): --  RR: 18 (29 Jun 2017 04:58) (18 - 18)  SpO2: 99% (29 Jun 2017 04:58) (99% - 99%)    Gen: AOx3, NAD, non-toxic, pleasant  CV: S1+S2 normal, no murmurs, nontachycardic  Resp: Clear bilat, no resp distress, no crackles/wheezes  Abd: Soft, nontender, +BS  Ext: RLE redness around ankle, but no ankle pain to flexion/extension. RLE thigh redness.    LABS:                        13.3   7.45  )-----------( 224      ( 29 Jun 2017 07:00 )             40.9     06-29    136  |  100  |  3<L>  ----------------------------<  90  3.6   |  20<L>  |  0.64    Ca    9.0      29 Jun 2017 07:00  Phos  2.9     06-29  Mg     1.8     06-28    TPro  6.4  /  Alb  3.2<L>  /  TBili  0.3  /  DBili  x   /  AST  18  /  ALT  18  /  AlkPhos  52  06-28    MICROBIOLOGY:    6/27 BCX x 2 Neg  6/27 UCX Neg    RADIOLOGY:    CT A/P:    IMPRESSION:   Right inguinal cellulitic change. No drainable collection or mass.    Thickened urinary bladder wall with questionable focus of gas in the   bladder wall. Urinalysis correlation is recommended to exclude   emphysematous cystitis.

## 2017-06-29 NOTE — PROGRESS NOTE ADULT - PROBLEM SELECTOR PLAN 1
- Pt was previously admitted in April 2017 for cellulitis of the right leg, was treated with IV Clindamycin and Keflex PO  - Pt received one dose of vancomycin 1gm ii ED.  Patient now on Ancef by ID.  monitor cultures.

## 2017-06-29 NOTE — PROGRESS NOTE ADULT - SUBJECTIVE AND OBJECTIVE BOX
Patient is a 51y old  Male who presents with a chief complaint of Fever, Chill, Redness and swelling on R thigh and Shin (27 Jun 2017 07:17).  Ct of A/P - no mass.  Patient now states his CANCER DOCTOR gave him a clean bill of health about his lymphoma about 5 years ago, but for the past 2 years he has noted swelling to R thigh independent of cellulitis infection.      SUBJECTIVE / OVERNIGHT EVENTS:    MEDICATIONS  (STANDING):  enoxaparin Injectable 40 milliGRAM(s) SubCutaneous every 24 hours  metoprolol succinate  milliGRAM(s) Oral daily  ceFAZolin   IVPB 1000 milliGRAM(s) IV Intermittent every 8 hours  sodium chloride 0.9%. 1000 milliLiter(s) (100 mL/Hr) IV Continuous <Continuous>  ketoconazole 2% Cream 1 Application(s) Topical two times a day    MEDICATIONS  (PRN):  acetaminophen   Tablet 650 milliGRAM(s) Oral every 6 hours PRN For Temp greater than 38 C (100.4 F)  acetaminophen   Tablet. 650 milliGRAM(s) Oral every 6 hours PRN Mild and Moderate Pain      Vital Signs Last 24 Hrs  T(C): 37.1 (06-29-17 @ 04:58), Max: 37.9 (06-28-17 @ 21:28)  HR: 81 (06-29-17 @ 04:58) (81 - 93)  BP: 138/94 (06-29-17 @ 04:58) (125/85 - 138/94)  RR: 18 (06-29-17 @ 04:58) (18 - 18)  SpO2: 99% (06-29-17 @ 04:58) (99% - 99%)  CAPILLARY BLOOD GLUCOSE        PHYSICAL EXAM:  GENERAL: NAD, well-developed  HEAD:  Atraumatic, Normocephalic  EYES: EOMI, PERRLA, conjunctiva and sclera clear  NECK: Supple, No JVD  CHEST/LUNG: Clear to auscultation bilaterally; No wheeze  HEART: Regular rate and rhythm; No murmurs, rubs, or gallops  ABDOMEN: Soft, Nontender, Nondistended; Bowel sounds present  EXTREMITIES:  2+ Peripheral Pulses, No clubbing, cyanosis, or edema  PSYCH: AAOx3  NEUROLOGY: non-focal  SKIN: No rashes or lesions    LABS:                        13.3   7.45  )-----------( 224      ( 29 Jun 2017 07:00 )             40.9     06-29    136  |  100  |  3<L>  ----------------------------<  90  3.6   |  20<L>  |  0.64    Ca    9.0      29 Jun 2017 07:00  Phos  2.9     06-29  Mg     1.8     06-28    TPro  6.4  /  Alb  3.2<L>  /  TBili  0.3  /  DBili  x   /  AST  18  /  ALT  18  /  AlkPhos  52  06-28      CARDIAC MARKERS ( 28 Jun 2017 07:05 )  x     / x     / 34 u/L / x     / x              RADIOLOGY & ADDITIONAL TESTS:    6/28/17-< from: CT Abdomen and Pelvis w/ IV Cont (06.28.17 @ 14:54) >  IMPRESSION:   Right inguinal cellulitic change. No drainable collection or mass.    Thickened urinary bladder wall with questionable focus of gas in the   bladder wall. Urinalysis correlation is recommended to exclude   emphysematous cystitis.      Imaging Personally Reviewed:    Consultant(s) Notes Reviewed:      Care Discussed with Consultants/Other Providers: iD Patient is a 51y old  Male who presents with a chief complaint of Fever, Chill, Redness and swelling on R thigh and Shin (27 Jun 2017 07:17).  Ct of A/P - no mass.  Patient now states his CANCER DOCTOR gave him a clean bill of health about his lymphoma about 5 years ago, but for the past 2 years he has noted swelling to R thigh independent of cellulitis infection.      SUBJECTIVE / OVERNIGHT EVENTS:    MEDICATIONS  (STANDING):  enoxaparin Injectable 40 milliGRAM(s) SubCutaneous every 24 hours  metoprolol succinate  milliGRAM(s) Oral daily  ceFAZolin   IVPB 1000 milliGRAM(s) IV Intermittent every 8 hours  sodium chloride 0.9%. 1000 milliLiter(s) (100 mL/Hr) IV Continuous <Continuous>  ketoconazole 2% Cream 1 Application(s) Topical two times a day    MEDICATIONS  (PRN):  acetaminophen   Tablet 650 milliGRAM(s) Oral every 6 hours PRN For Temp greater than 38 C (100.4 F)  acetaminophen   Tablet. 650 milliGRAM(s) Oral every 6 hours PRN Mild and Moderate Pain      Vital Signs Last 24 Hrs  T(C): 37.1 (06-29-17 @ 04:58), Max: 37.9 (06-28-17 @ 21:28)  HR: 81 (06-29-17 @ 04:58) (81 - 93)  BP: 138/94 (06-29-17 @ 04:58) (125/85 - 138/94)  RR: 18 (06-29-17 @ 04:58) (18 - 18)  SpO2: 99% (06-29-17 @ 04:58) (99% - 99%)  CAPILLARY BLOOD GLUCOSE        PHYSICAL EXAM:  GENERAL: NAD, well-developed  HEAD:  Atraumatic, Normocephalic  EYES: EOMI, PERRLA, conjunctiva and sclera clear  NECK: Supple, No JVD  CHEST/LUNG: Clear to auscultation bilaterally; No wheeze  HEART: Regular rate and rhythm; No murmurs, rubs, or gallops  ABDOMEN: Soft, Nontender, Nondistended; Bowel sounds present  EXTREMITIES:  2+ Peripheral Pulses, No clubbing, cyanosis, R Leg edema greatly  improved BUT R Thigh Larger than l thigh.  PSYCH: AAOx3  NEUROLOGY: non-focal  SKIN: White material between 4th and 5th toes., Erythema to R thigh and R calf greatly improved.    LABS:                        13.3   7.45  )-----------( 224      ( 29 Jun 2017 07:00 )             40.9     06-29    136  |  100  |  3<L>  ----------------------------<  90  3.6   |  20<L>  |  0.64    Ca    9.0      29 Jun 2017 07:00  Phos  2.9     06-29  Mg     1.8     06-28    TPro  6.4  /  Alb  3.2<L>  /  TBili  0.3  /  DBili  x   /  AST  18  /  ALT  18  /  AlkPhos  52  06-28      CARDIAC MARKERS ( 28 Jun 2017 07:05 )  x     / x     / 34 u/L / x     / x              RADIOLOGY & ADDITIONAL TESTS:    6/28/17-< from: CT Abdomen and Pelvis w/ IV Cont (06.28.17 @ 14:54) >  IMPRESSION:   Right inguinal cellulitic change. No drainable collection or mass.    Thickened urinary bladder wall with questionable focus of gas in the   bladder wall. Urinalysis correlation is recommended to exclude   emphysematous cystitis.      Imaging Personally Reviewed:    Consultant(s) Notes Reviewed:      Care Discussed with Consultants/Other Providers: iD

## 2017-06-29 NOTE — PROGRESS NOTE ADULT - PROBLEM SELECTOR PLAN 3
- Patient was diagnosed with lymphoma in 2011   - s/p resection and chemotherapy, now in remission  ct of A/P -6/28/17- no MASS.  c/o R THIGH SWELLINGx  2 YEARS- will do Ct of R thigh to mass.- patient very worried and concerned.

## 2017-06-29 NOTE — PROGRESS NOTE ADULT - ASSESSMENT
51 y.o. male with PMHx of Lymphoma (s/p chemo in 2011, now in remission), HTN, previously admitted for cellulitis of the right leg in April 2017, admitted for cellulitis of the right leg.   R leg cellulitis- ID called to CONSULT.  Hyponatremia  Fever prior to admission.  R thigh pain on palpation 51 y.o. male with PMHx of Lymphoma (s/p chemo in 2011, now in remission), HTN, previously admitted for cellulitis of the right leg in April 2017, admitted for cellulitis of the right leg.   R leg cellulitis- ID called to CONSULT. Cellulitis improving.  Hyponatremia.  R thigh Edema/ Enlarged x 2 years.  Tinea Pedis.

## 2017-06-29 NOTE — PROGRESS NOTE ADULT - ASSESSMENT
52 yo M with history of lymphoma in remission, with episode of cellulitis in 4/2017 treated with Keflex/clindamycin, now returns with fevers, chills and RLE redness. No trauma to RLE. No purulence or ulcers. Having fevers, was tachycardic in ED. Appears to be sepsis 2/2 to non-purulent cellulitis. On exam appears consistent with a strep cellulitis. Most likely recurrent due to prior insult to skin (prior cellulitis). Not consistent with deeper infection.  Patient now afebrile x 24, redness of RLE decreasing but still present. CT A/P shows ? emphysematous cystitis, UA/UCX Neg (doubt active empysematous cystitis).  - Cefazolin 1 gram IV q 8  - Monitor RLE, if continues to improve, would likely send patient home on Keflex to complete course  - Monitor for fevers      Orion Vale MD  Pager [phone]  After 5pm and on weekends call [phone]

## 2017-06-30 VITALS
RESPIRATION RATE: 18 BRPM | HEART RATE: 76 BPM | TEMPERATURE: 98 F | SYSTOLIC BLOOD PRESSURE: 128 MMHG | OXYGEN SATURATION: 100 % | DIASTOLIC BLOOD PRESSURE: 90 MMHG

## 2017-06-30 DIAGNOSIS — B00.9 HERPESVIRAL INFECTION, UNSPECIFIED: ICD-10-CM

## 2017-06-30 LAB
BUN SERPL-MCNC: 4 MG/DL — LOW (ref 7–23)
CALCIUM SERPL-MCNC: 8.6 MG/DL — SIGNIFICANT CHANGE UP (ref 8.4–10.5)
CHLORIDE SERPL-SCNC: 101 MMOL/L — SIGNIFICANT CHANGE UP (ref 98–107)
CO2 SERPL-SCNC: 23 MMOL/L — SIGNIFICANT CHANGE UP (ref 22–31)
CREAT SERPL-MCNC: 0.59 MG/DL — SIGNIFICANT CHANGE UP (ref 0.5–1.3)
GLUCOSE SERPL-MCNC: 96 MG/DL — SIGNIFICANT CHANGE UP (ref 70–99)
HCT VFR BLD CALC: 40.9 % — SIGNIFICANT CHANGE UP (ref 39–50)
HGB BLD-MCNC: 13.4 G/DL — SIGNIFICANT CHANGE UP (ref 13–17)
MCHC RBC-ENTMCNC: 29.6 PG — SIGNIFICANT CHANGE UP (ref 27–34)
MCHC RBC-ENTMCNC: 32.8 % — SIGNIFICANT CHANGE UP (ref 32–36)
MCV RBC AUTO: 90.5 FL — SIGNIFICANT CHANGE UP (ref 80–100)
NRBC # FLD: 0 — SIGNIFICANT CHANGE UP
PLATELET # BLD AUTO: 248 K/UL — SIGNIFICANT CHANGE UP (ref 150–400)
PMV BLD: 9.3 FL — SIGNIFICANT CHANGE UP (ref 7–13)
POTASSIUM SERPL-MCNC: 3.9 MMOL/L — SIGNIFICANT CHANGE UP (ref 3.5–5.3)
POTASSIUM SERPL-SCNC: 3.9 MMOL/L — SIGNIFICANT CHANGE UP (ref 3.5–5.3)
RBC # BLD: 4.52 M/UL — SIGNIFICANT CHANGE UP (ref 4.2–5.8)
RBC # FLD: 14 % — SIGNIFICANT CHANGE UP (ref 10.3–14.5)
SODIUM SERPL-SCNC: 139 MMOL/L — SIGNIFICANT CHANGE UP (ref 135–145)
WBC # BLD: 6.3 K/UL — SIGNIFICANT CHANGE UP (ref 3.8–10.5)
WBC # FLD AUTO: 6.3 K/UL — SIGNIFICANT CHANGE UP (ref 3.8–10.5)

## 2017-06-30 PROCEDURE — 99232 SBSQ HOSP IP/OBS MODERATE 35: CPT

## 2017-06-30 PROCEDURE — 99239 HOSP IP/OBS DSCHRG MGMT >30: CPT

## 2017-06-30 RX ORDER — CEPHALEXIN 500 MG
500 CAPSULE ORAL
Qty: 0 | Refills: 0 | Status: DISCONTINUED | OUTPATIENT
Start: 2017-06-30 | End: 2017-06-30

## 2017-06-30 RX ORDER — ACETAMINOPHEN 500 MG
2 TABLET ORAL
Qty: 0 | Refills: 0 | COMMUNITY
Start: 2017-06-30

## 2017-06-30 RX ORDER — KETOCONAZOLE 20 MG/G
1 AEROSOL, FOAM TOPICAL
Qty: 1 | Refills: 0 | OUTPATIENT
Start: 2017-06-30

## 2017-06-30 RX ORDER — DOCOSANOL 100 MG/G
1 CREAM TOPICAL
Qty: 0 | Refills: 0 | COMMUNITY
Start: 2017-06-30

## 2017-06-30 RX ORDER — DOCOSANOL 100 MG/G
1 CREAM TOPICAL THREE TIMES A DAY
Qty: 0 | Refills: 0 | Status: DISCONTINUED | OUTPATIENT
Start: 2017-06-30 | End: 2017-06-30

## 2017-06-30 RX ADMIN — ENOXAPARIN SODIUM 40 MILLIGRAM(S): 100 INJECTION SUBCUTANEOUS at 05:25

## 2017-06-30 RX ADMIN — Medication 100 MILLIGRAM(S): at 05:25

## 2017-06-30 RX ADMIN — KETOCONAZOLE 1 APPLICATION(S): 20 AEROSOL, FOAM TOPICAL at 05:24

## 2017-06-30 RX ADMIN — Medication 100 MILLIGRAM(S): at 05:24

## 2017-06-30 NOTE — DISCHARGE NOTE ADULT - PLAN OF CARE
complete ABX Complete Keflex and f/u with PCP 1 week control c/w home meds Control c/w Nizoral cream and f/u PCP resolved NO mass on Ct of A/P and R thigh

## 2017-06-30 NOTE — PROGRESS NOTE ADULT - SUBJECTIVE AND OBJECTIVE BOX
KEVIN MONTIEL 51y [id number]    Patient is a 51y old  Male who presents with a chief complaint of R Leg Cellulitis with Sepsis on admission sec to R leg Lymphedema and R Foot tinea pedis. (2017 09:30)      Follow Up/CC:  cellulitis    Interval History/ROS: feels well    Allergies    No Known Allergies    Intolerances        ANTIMICROBIALS:  cephalexin 500 four times a day      MEDICATIONS  (STANDING):  enoxaparin Injectable 40 milliGRAM(s) SubCutaneous every 24 hours  metoprolol succinate  milliGRAM(s) Oral daily  sodium chloride 0.9%. 1000 milliLiter(s) (100 mL/Hr) IV Continuous <Continuous>  ketoconazole 2% Cream 1 Application(s) Topical two times a day  cephalexin 500 milliGRAM(s) Oral four times a day  docosanol 10% Cream 1 Application(s) Topical three times a day    MEDICATIONS  (PRN):  acetaminophen   Tablet 650 milliGRAM(s) Oral every 6 hours PRN For Temp greater than 38 C (100.4 F)  acetaminophen   Tablet. 650 milliGRAM(s) Oral every 6 hours PRN Mild and Moderate Pain        Vital Signs Last 24 Hrs  T(C): 36.9 (2017 05:09), Max: 37.3 (2017 21:26)  T(F): 98.4 (2017 05:09), Max: 99.2 (2017 21:26)  HR: 76 (2017 05:09) (76 - 92)  BP: 128/90 (2017 05:09) (128/90 - 133/93)  BP(mean): --  RR: 18 (2017 05:09) (18 - 18)  SpO2: 100% (2017 05:09) (99% - 100%)    CBC Full  -  ( 2017 06:30 )  WBC Count : 6.30 K/uL  Hemoglobin : 13.4 g/dL  Hematocrit : 40.9 %  Platelet Count - Automated : 248 K/uL  Mean Cell Volume : 90.5 fL  Mean Cell Hemoglobin : 29.6 pg  Mean Cell Hemoglobin Concentration : 32.8 %  Auto Neutrophil # : x  Auto Lymphocyte # : x  Auto Monocyte # : x  Auto Eosinophil # : x  Auto Basophil # : x  Auto Neutrophil % : x  Auto Lymphocyte % : x  Auto Monocyte % : x  Auto Eosinophil % : x  Auto Basophil % : x    06-30    139  |  101  |  4<L>  ----------------------------<  96  3.9   |  23  |  0.59    Ca    8.6      2017 06:30  Phos  2.9             Urinalysis Basic - ( 2017 18:00 )    Color: PLYEL / Appearance: CLEAR / S.011 / pH: 6.5  Gluc: NEGATIVE / Ketone: NEGATIVE  / Bili: NEGATIVE / Urobili: NORMAL E.U.   Blood: NEGATIVE / Protein: NEGATIVE / Nitrite: NEGATIVE   Leuk Esterase: NEGATIVE / RBC: 0-2 / WBC x   Sq Epi: x / Non Sq Epi: x / Bacteria: x        MICROBIOLOGY:        Culture - Urine (17 @ 06:28)    Culture - Urine:   NO GROWTH AT 24 HOURS    Specimen Source: URINE MIDSTREAM    Culture - Blood (17 @ 02:04)    Culture - Blood:   NO ORGANISMS ISOLATED  NO ORGANISMS ISOLATED AT 48 HRS.    Specimen Source: BLOOD PERIPHERAL    Culture - Blood (17 @ 01:54)    Culture - Blood:   NO ORGANISMS ISOLATED  NO ORGANISMS ISOLATED AT 48 HRS.    Specimen Source: BLOOD VENOUS              RADIOLOGY    CXR:    CT HEAD:    CT CHEST:    CT ABDOMEN:    MRI:    OTHER:

## 2017-06-30 NOTE — PROGRESS NOTE ADULT - PROBLEM SELECTOR PROBLEM 1
Cellulitis of right lower extremity

## 2017-06-30 NOTE — PROGRESS NOTE ADULT - PROBLEM SELECTOR PLAN 2
- RESOLVED SEPSIS   - Source of infection is cellulitis of the right leg   - id changed to Ancef- now Keflex for home  - f/u blood cultures for any bacterial growth.

## 2017-06-30 NOTE — DISCHARGE NOTE ADULT - PATIENT PORTAL LINK FT
“You can access the [employer] Patient Portal, offered by [facility], by registering with the following website: [email or web address]”

## 2017-06-30 NOTE — DISCHARGE NOTE ADULT - MEDICATION SUMMARY - MEDICATIONS TO TAKE
I will START or STAY ON the medications listed below when I get home from the hospital:    acetaminophen 325 mg oral tablet  -- 2 tab(s) by mouth every 6 hours, As needed, Mild and Moderate Pain  -- Indication: For Pain     metoprolol succinate 100 mg oral tablet, extended release  -- 1 tab(s) by mouth once a day  -- Indication: For HTN (hypertension)    cefadroxil 500 mg oral capsule  -- 1 cap(s) by mouth every 6 hours. Last day 7/7/17  -- Finish all this medication unless otherwise directed by prescriber.    -- Indication: For Cellulitis    docosanol 10% topical cream  -- 1 application on skin 3 times a day  -- Indication: For Cold sore     ketoconazole 2% topical cream  -- 1 application on skin 2 times a day  -- Indication: For Tinea pedis of right foot

## 2017-06-30 NOTE — PROGRESS NOTE ADULT - PROVIDER SPECIALTY LIST ADULT
Hospitalist
Infectious Disease

## 2017-06-30 NOTE — DISCHARGE NOTE ADULT - HOSPITAL COURSE
51 y.o. male with PMHx of Lymphoma (s/p chemo in 2011, now in remission), HTN, previously admitted for cellulitis of the right leg in April 2017, presenting today with fever. The patient states he was in his usual state of health until 7pm last night when he started to have chills and sweats. The patient took is his temperature at that time and stated it was "around 105 F". The patient stated at that time he noticed his right leg was very erythematous specifically in the thigh but also at the ankles as well. The patient denies cough, nasal congestion, rhinorrhea nausea/vomting, abdominal pain, dysuria, diarrhea/constipation. The patient was previously treated for cellulitis of the right leg in April 2017 with IV Clindamycin before being switched to PO Keflex on discharge.     In the ED    Vital Signs Last 24 Hrs  T(C): 39.4, Max: 39.4 (06-26 @ 23:25)  T(F): 102.9, Max: 102.9 (06-26 @ 23:25)  HR: 101 (101 - 107)  BP: 144/83 (144/83 - 147/91)  BP(mean): --  RR: 16 (16 - 16)  SpO2: 98% (98% - 98%)    In the ED the patient was found to be febrile to 102.9 F and tachycardic. The patient received one dose of vancomycin 1gm. CBC showed no leukocytosis.      HOSPITAL COURSE:  Patient was treated with IV Clinda and blood cultures sent.  He got iv vanco x1.  R le doppler - neg DVT  He was seen by ID and rec Ancef iv . Blood cultures were neg x >48 hrs.  He noted this was the second episode. He was noted to have Tinea pedis- with masercation between R 4th and % th toes.  Treated with nizoral dcream bid and encouraged not to have any more skin break down to feet as this predispose him to infections.  Ct od A/P and r Le was done- Neg for any mass because pt c/o ?/ Mass to R thigh and induration.  R thigh induration and erythema improved with antibiotics.  He was still noted to HAVE R Thigh swelling X 2 year- denied trauma.  Patient has R le Lympedema that is chronic and contribute to risk of infection.  out patient f/u PCP 1 week

## 2017-06-30 NOTE — PROGRESS NOTE ADULT - PROBLEM SELECTOR PLAN 1
- Pt was previously admitted in April 2017 for cellulitis of the right leg, was treated with IV Clindamycin and Keflex PO  - Pt received one dose of vancomycin 1gm ii ED.  Patient now on Ancef by ID.  CULTURES- NEG  D/c home with Keflex x 7 days and f/u PCP

## 2017-06-30 NOTE — PROGRESS NOTE ADULT - SUBJECTIVE AND OBJECTIVE BOX
Patient is a 51y old  Male who presents with a chief complaint of Fever, Chill, Redness and swelling on R thigh and Shin (2017 07:17).  Patient noted r thigh swelling x 2 years- denied trauma or accident to r leg.  R leg erythema almost completely gone BUT R Thigh Swelling still present.  C/o tiny lesions to lower  lip.        SUBJECTIVE / OVERNIGHT EVENTS:    MEDICATIONS  (STANDING):  enoxaparin Injectable 40 milliGRAM(s) SubCutaneous every 24 hours  metoprolol succinate  milliGRAM(s) Oral daily  ceFAZolin   IVPB 1000 milliGRAM(s) IV Intermittent every 8 hours  sodium chloride 0.9%. 1000 milliLiter(s) (100 mL/Hr) IV Continuous <Continuous>  ketoconazole 2% Cream 1 Application(s) Topical two times a day    MEDICATIONS  (PRN):  acetaminophen   Tablet 650 milliGRAM(s) Oral every 6 hours PRN For Temp greater than 38 C (100.4 F)  acetaminophen   Tablet. 650 milliGRAM(s) Oral every 6 hours PRN Mild and Moderate Pain      Vital Signs Last 24 Hrs  T(C): 36.9 (17 @ 05:09), Max: 37.3 (17 @ 21:26)  HR: 76 (17 @ 05:09) (76 - 92)  BP: 128/90 (17 @ 05:09) (128/90 - 133/93)  RR: 18 (17 @ 05:09) (18 - 18)  SpO2: 100% (17 @ 05:09) (99% - 100%)      PHYSICAL EXAM:  GENERAL: NAD, well-developed  HEAD:  Atraumatic, Normocephalic  EYES: EOMI, PERRLA, conjunctiva and sclera clear  NECK: Supple, No JVD  CHEST/LUNG: Clear to auscultation bilaterally; No wheeze  HEART: Regular rate and rhythm; No murmurs, rubs, or gallops  ABDOMEN: Soft, Nontender, Nondistended; Bowel sounds present  EXTREMITIES:  2+ Peripheral Pulses, No clubbing, cyanosis, or edema  PSYCH: AAOx3  NEUROLOGY: non-focal  SKIN:RESOLVED R LE ERYTHEMA.  Tiny lesion to skin just below lower lip c/w 'fever rash"    LABS:                        13.4   6.30  )-----------( 248      ( 2017 06:30 )             40.9     06-    139  |  101  |  4<L>  ----------------------------<  96  3.9   |  23  |  0.59    Ca    8.6      2017 06:30  Phos  2.9             Urinalysis Basic - ( 2017 18:00 )    Color: PLYEL / Appearance: CLEAR / S.011 / pH: 6.5  Gluc: NEGATIVE / Ketone: NEGATIVE  / Bili: NEGATIVE / Urobili: NORMAL E.U.   Blood: NEGATIVE / Protein: NEGATIVE / Nitrite: NEGATIVE   Leuk Esterase: NEGATIVE / RBC: 0-2 / WBC x   Sq Epi: x / Non Sq Epi: x / Bacteria: x      RADIOLOGY & ADDITIONAL TESTS:  < from: CT Lower Extremity w/ IV Cont, Right (17 @ 17:55) >  Findings most compatible with cellulitis of the right thigh and upper   leg. No drainable fluid collection or solid mass.        Imaging Personally Reviewed:    Consultant(s) Notes Reviewed:      Care Discussed with Consultants/Other Providers:  ID

## 2017-06-30 NOTE — PROGRESS NOTE ADULT - ASSESSMENT
50 yo M with history of lymphoma in remission, with episode of cellulitis in 4/2017 treated with Keflex/clindamycin, now returns with fevers, chills and RLE redness with cellulitis

## 2017-06-30 NOTE — DISCHARGE NOTE ADULT - CARE PLAN
Principal Discharge DX:	Cellulitis of right lower extremity  Goal:	complete ABX  Instructions for follow-up, activity and diet:	Complete Keflex and f/u with PCP 1 week  Secondary Diagnosis:	HTN (hypertension)  Goal:	control  Instructions for follow-up, activity and diet:	c/w home meds  Secondary Diagnosis:	Tinea pedis of right foot  Goal:	Control  Instructions for follow-up, activity and diet:	c/w Nizoral cream and f/u PCP  Secondary Diagnosis:	Lymphoma  Goal:	resolved  Instructions for follow-up, activity and diet:	NO mass on Ct of A/P and R thigh

## 2017-06-30 NOTE — PROGRESS NOTE ADULT - ATTENDING COMMENTS
READY for HOME.  Chronic Lymphedema most likely causing R LE Swelling resulting in predisposition to infections- NO DVT / No MASS found.  Cellulitis improved.

## 2017-06-30 NOTE — PROGRESS NOTE ADULT - ASSESSMENT
51 y.o. male with PMHx of Lymphoma (s/p chemo in 2011, now in remission), HTN, previously admitted for cellulitis of the right leg in April 2017, admitted for cellulitis of the right leg.   R leg cellulitis- ID called to CONSULT. Cellulitis improving.  Hyponatremia.  R thigh Edema/ Enlarged x 2 years.- Ct of A/P and of R leg- NEGATIVE FOR ANY MASS OR LYMPHOMA.  Patient ready for HOME.  Tinea Pedis.

## 2017-07-01 LAB
BACTERIA UR CULT: SIGNIFICANT CHANGE UP
SPECIMEN SOURCE: SIGNIFICANT CHANGE UP

## 2017-07-02 LAB
BACTERIA BLD CULT: SIGNIFICANT CHANGE UP
BACTERIA BLD CULT: SIGNIFICANT CHANGE UP

## 2017-11-16 RX ORDER — METOPROLOL TARTRATE 50 MG
1 TABLET ORAL
Qty: 0 | Refills: 0 | COMMUNITY

## 2018-01-08 NOTE — H&P ADULT. - RS GEN PE MLT RESP DETAILS PC
Take your medications as prescribed. Follow up with your PCP in 1 week if you are not improving. Return to the ED if you have bowel or bladder incontinence, are unable to feel around your rectum, have difficulty walking, chest pain, shortness of breath or any other concerns. Refer to the additional material for further information.    
airway patent/breath sounds equal

## 2018-08-17 NOTE — ED ADULT TRIAGE NOTE - ARRIVAL FROM
DUPLEX RENAL ARTERY



CLINICAL HISTORY: ELEVATED SERUM CREATININE renal insufficiency



TECHNIQUE: Renal arterial Doppler



COMPARISON STUDY:  None



FINDINGS: Normal renal arterial flow bilaterally. Normal resistive indices

bilaterally. No evidence for velocity increase or abnormality waveform.



IMPRESSION:  Normal renal arterial Doppler. 









The above report was generated using voice recognition software.  It may contain

grammatical, syntax or spelling errors.







Electronically signed by:  Jacobo Morfin M.D.

8/17/2018 9:52 AM



Dictated Date/Time:  8/17/2018 9:50 AM Home

## 2018-08-27 NOTE — PROGRESS NOTE ADULT - PROBLEM SELECTOR PLAN 4
100
- c/w home Metoprolol 100mg Extended release

## 2018-11-17 NOTE — DISCHARGE NOTE ADULT - NS DC INTERPRETER YES NO
Social History   · Alcohol use (V49.89) (Z78.9)   · Does not use illicit drugs (V49.89) (Z78.9)   ·    · Never a smoker   · Occupation    Current Meds   1. Advil 200 MG Oral Capsule; TAKE 2 CAPSULES EVERY DAY AS NEEDED;   Therapy: (Recorded:13May2016) to Recorded   2. HydroCHLOROthiazide 12.5 MG Oral Capsule; Take 2 capsules by mouth  daily;   Therapy: 28Nov2016 to (Evaluate:27May2017)  Requested for: 28Nov2016; Last   Rx:28Nov2016 Ordered    Allergies   1. Cephalosporins   2. Duricef   3. Penicillins    Vitals  Signs   Recorded: 30May2017 07:38AM   Weight: 209.6 lb   BMI Calculated: 38.34  BSA Calculated: 1.95  Recorded: 16May2017 07:37AM   Systolic: 110, LUE, Sitting  Diastolic: 70, LUE, Sitting  LMP: menopause  Recorded: 11May2017 09:30AM   Height: 5 ft 2 in  Temperature: 98.3 F, Tympanic  Heart Rate: 80  Pulse Quality: Normal  Respiration Quality: Normal  Respiration: 16    Nurse Documentation  WM Behavioral Documentation:   Had a successful weekend considering out of town wedding.    Discussed/suggested coping techniques.                     Signatures   Electronically signed by : Andrés Gardner, ; May 30 2017  7:40AM CST     No

## 2019-01-29 NOTE — PROGRESS NOTE ADULT - PROBLEM/PLAN-3
Initial Anesthesia Post-op Note    Patient: Leo Raman  Procedure(s) Performed: ENDOSCOPIC NASOPHARYNGEAL BIOPSYDIRECT LARYNGOSCOPY AND RANDOM DIRECTED BIOPSIES,ESOPHAGOSCOPY  Anesthesia type: General    Vital Last Value   Temperature (P) 36.8 °C (98.3 °F) (01/29/19 1535)   Pulse 77 (01/29/19 1542)   Respiratory Rate 16 (01/29/19 1542)   Non-Invasive   Blood Pressure 140/75 (01/29/19 1542)   Arterial  Blood Pressure     Pulse Oximetry 94 % (01/29/19 1542)     Last 24 I/O:     Intake/Output Summary (Last 24 hours) at 1/29/2019 1545  Last data filed at 1/29/2019 1542  Gross per 24 hour   Intake 900 ml   Output 100 ml   Net 800 ml       PATIENT LOCATION: PACU Phase 1  POST-OP VITAL SIGNS: stable  LEVEL OF CONSCIOUSNESS: participates in exam, answers questions appropriately, alert, awake and oriented  RESPIRATORY STATUS: spontaneous ventilation  CARDIOVASCULAR: blood pressure returned to baseline  HYDRATION: euvolemic    PAIN MANAGEMENT: adequately controlled  NAUSEA: None  AIRWAY PATENCY: patent  POST-OP ASSESSMENT: no complications, patient tolerated procedure well with no complications, no evidence of recall and sufficiently recovered from acute administration of anesthesia effects and able to participate in evaluation  COMPLICATIONS: none  HANDOFF:  Handoff to receiving nurse was performed and questions were answered       DISPLAY PLAN FREE TEXT

## 2019-05-15 NOTE — PROGRESS NOTE ADULT - PROBLEM/PLAN-2
Patient received on 5 liter nasal cannula sating 95%. Decreased O2 to 4 liters. Breath sounds- diminished/expiratory wheezes and some crackles in bases. Plan to continue nebs as ordered. DISPLAY PLAN FREE TEXT

## 2019-06-17 NOTE — DISCHARGE NOTE ADULT - PAIN PRESENT
[de-identified] : 58 year old obese male with PMH of COPD , diabetes, PPP for syncope. Patient went to the emergency room on February 4, 2018 with complaints of a swollen lymph node on right side of neck, which he had noticed several months prior to presentation. Lymph node was originally not painful but became painful about one week prior to presentation, which prompted him to go to the emergency room. No fevers, night sweats, anorexia or significant changes in weight. In the ED, patient underwent CT neck soft tissue, chest, abdomen and pelvis, which found multiple areas of LAD,Right-sided enlarged level 5 cervical chain lymph nodes measuring up to  1.9 x 1.7 cm (series 9 image 193). Enlarged right supraclavicular lymph  nodes measuring up to 1.6 x 1 cm(series 9 image 214).   in the R supraclavicular lymph node, R axillary lymph node,  Enlarged mesenteric lymph nodes measuring up to 1.3  x 1 cm (series 2 image 92)., There was no evidence of mediastinal or hilar LAD at that time. However, patient is not certain if he took prednisone prior to imaging. As per wife, patient takes short-course prednisone tapers every few months secondary to bronchitis. OTTONIEL plummer is a former 1 PPD smoker for 30-40 years who quit about five months prior to presentation and denies any previous history of asbestosis exposure. He worked as a . He has no pets at home. Biopsy of right cervical lymph node by ENT  Low grade B-cell lymphoma, consistent with nisreen marginal zone lymphoma.  the neoplastic cells are CD20+ CD79a+ PAX5+ B-cells that co-express BCL2. They are negative for CD5, CD10, BCL6, cyclinD1, and CD43. The proliferation index (Ki67 labeling) ranges from 5 to 20% in different areas excluding the germinal centers, flow cytometry studies performed at [facility] show monotypic B-cells (38% of cells), positive for kappa, CD19, CD20, FMC-7, CD23, minimal CD10; negative for CD5. Viability is 78%.   *** In summary, the findings are diagnostic of low grade B-cell lymphoma, the features are consistent with nisreen marginal zone lymphoma.     2 , peripheral blood flow showed - A MINUTE MONOCLONAL, CD10+ B-CELL POPULATION IS OBSERVED (0.14%). kappa and CD 20 bright . Lab work showed normal CBC , Hb A1c 9, negative HIV , Hep c and SPEP .    \par Since surgery 2 weeks ago he noted marked increase in painful  nisreen masses in the neck , supraclavicular and right axillae , he takes percocet to sleep with partial relief  , PET showed generalized adenopathy with maximum SUV 9 . No bone or marrow uptake. He complains of dyspnea on exertion , mild cough , improved with short course of prednisone, he lost few lbs and denies fever or night sweats. \par No history of EGD or colonoscopy . \par  [de-identified] : 08/27/2018 : Patient returns for follow up after BR X 3 with near complete response. Treatment was held due to worsening complaints of weakness, exertional dyspnea. RUQ pain . He followed with pulmonary and was placed on inhaled bronchodilators , He had MUGA scan and may require a second pacemaker lead. He denies any B symptoms or any new lumps or adenopathy . \par \par 10/23/2018 Patient returns for next dose of rituxan , He denies any new complaints . He was seen by GI for right sided abdominal tenderness, felt to be muscular in origin , CT scan apparently shows abdominal hernias probably unrelated to his complaint . He is scheduled for surveillance colonoscopy . He denies any B symptoms, he continues to have dyspnea on exertion and went on social security disability .\par \par 12/17/2018 Patient returns for next dose of rituxan maintenance . He denies any B symptoms , continues to complain of mild RUQ pain and tenderness. He is followed by cardiology and pulmonary and is undergoing work up to rule out cardiac sarcoidosis . \par \par 06/17/2019 Patient returns for follow up . he has been on maintenance rituxan for one year and was treated recently as outpatient for suspected pneumonia , he lost 9 lbs and is feeling better now. He had cardiac work up with revealed left atrial thrombus and started on xarelto , CT angio was negative , Cardiac CT showed LAD plaque without oclusion .  No

## 2019-08-27 NOTE — PATIENT PROFILE ADULT. - BLOOD AVOIDANCE/RESTRICTIONS, PROFILE
none Delirium due to multiple etiologies Delirium due to multiple etiologies Delirium due to multiple etiologies Delirium due to multiple etiologies

## 2020-06-19 NOTE — DISCHARGE NOTE ADULT - CARE PLAN
Principal Discharge DX:	Cellulitis of right lower extremity  Goal:	Finish antibiotics  Instructions for follow-up, activity and diet:	Complete antibiotics as ordered.  Follow up with your PMD within one week of discharge to discuss your admission.  Secondary Diagnosis:	HTN (hypertension)  Instructions for follow-up, activity and diet:	Continue with medication as prescribed.
None known

## 2022-11-02 NOTE — ED ADULT NURSE NOTE - CAS EDN INTEG ASSESS
WDL
Pt. is scheduled for a laparoscopic right inguinal hernia afqiuq04/23/22.  Pt. verbalized understanding of instructions.

## 2023-01-26 NOTE — DISCHARGE NOTE ADULT - ADDITIONAL INSTRUCTIONS
Enoxaparin/Lovenox - Compliance/Enoxaparin/Lovenox - Dietary Advice/Enoxaparin/Lovenox - Follow up monitoring/Enoxaparin/Lovenox - Potential for adverse drug reactions and interactions
Complete antibiotics as prescribed.  Follow up with your PMD within one week of discharge to discuss your admission.

## 2024-03-19 NOTE — ED ADULT NURSE NOTE - PAIN RATING/NUMBER SCALE (0-10): REST
Patient intermittently forgetful and pulling at lines. Additional safety measures in place. Patient turned and cleansed Q2h or prn. No signs of injury. Patient does not meet criteria for restraint removal at this time.   Problem: Potential for injury, Restraints  Goal: Verbalizes criteria for restraint discontinuation  Description: Document on Patient Education Activity  3/18/2024 1906 by Merna Lainez, RN  Outcome: Not met, plan adjusted  3/18/2024 1818 by Merna Lainez, RN  Outcome: Not met, plan adjusted      0